# Patient Record
Sex: MALE | Race: WHITE | NOT HISPANIC OR LATINO | Employment: FULL TIME | URBAN - METROPOLITAN AREA
[De-identification: names, ages, dates, MRNs, and addresses within clinical notes are randomized per-mention and may not be internally consistent; named-entity substitution may affect disease eponyms.]

---

## 2017-08-01 DIAGNOSIS — I77.810 AORTIC ROOT DILATION: ICD-10-CM

## 2017-08-01 DIAGNOSIS — Q20.3 TRANSPOSITION OF THE GREAT ARTERIES, ISOLATED (SDD): ICD-10-CM

## 2017-08-01 NOTE — PROGRESS NOTES
I received a phone call from Dr. Kaya Welch regarding this patient.  He was last seen at [facility] about 8 years ago.  He has a history of ventricular septal defect, transposition of the great arteries, and he is status post arterial switch with VSD repair.  He subsequently required plication of his aortic root.  This was performed at [facility].  She would like him seen with a CTA of the chest to assess his root size.  Her last clinic visit was about 6 months ago.  At that time, an echocardiogram revealed an aortic root size of about 4.6 cm.

## 2017-08-08 ENCOUNTER — TELEPHONE (OUTPATIENT)
Dept: PEDIATRIC CARDIOLOGY | Facility: CLINIC | Age: 22
End: 2017-08-08

## 2017-08-08 NOTE — TELEPHONE ENCOUNTER
Left detailed message discussing tentatively appointment scheduled for 9/21.  Requested mom or patient call back to confirm date/time work or to reschedule.

## 2017-08-08 NOTE — TELEPHONE ENCOUNTER
----- Message from Darren Valladares MD sent at 8/1/2017 10:05 AM CDT -----  Can you call his mom, Yael, at [phone] to set him up for a visit in a [facility] with an echo, EKG, and non-coronary chest CTA.  I ordered the CT.    Thanks

## 2017-09-06 DIAGNOSIS — Q20.3 TGA (TRANSPOSITION OF GREAT ARTERIES): Primary | ICD-10-CM

## 2017-09-21 ENCOUNTER — HOSPITAL ENCOUNTER (OUTPATIENT)
Dept: CARDIOLOGY | Facility: CLINIC | Age: 22
Discharge: HOME | End: 2017-09-21
Payer: COMMERCIAL

## 2017-09-21 ENCOUNTER — HOSPITAL ENCOUNTER (OUTPATIENT)
Dept: RADIOLOGY | Facility: HOSPITAL | Age: 22
Discharge: HOME | End: 2017-09-21
Attending: PEDIATRICS
Payer: COMMERCIAL

## 2017-09-21 ENCOUNTER — OFFICE VISIT (OUTPATIENT)
Dept: CARDIOLOGY | Facility: CLINIC | Age: 22
End: 2017-09-21
Payer: COMMERCIAL

## 2017-09-21 VITALS
WEIGHT: 168 LBS | HEART RATE: 80 BPM | HEIGHT: 71 IN | SYSTOLIC BLOOD PRESSURE: 119 MMHG | OXYGEN SATURATION: 99 % | DIASTOLIC BLOOD PRESSURE: 60 MMHG | BODY MASS INDEX: 23.52 KG/M2

## 2017-09-21 DIAGNOSIS — Q25.6 PULMONARY ARTERY STENOSIS: ICD-10-CM

## 2017-09-21 DIAGNOSIS — Q20.3 TRANSPOSITION OF THE GREAT ARTERIES, ISOLATED (SDD): ICD-10-CM

## 2017-09-21 DIAGNOSIS — Q20.3 TGA (TRANSPOSITION OF GREAT ARTERIES): ICD-10-CM

## 2017-09-21 DIAGNOSIS — Z87.74 S/P VSD REPAIR: ICD-10-CM

## 2017-09-21 DIAGNOSIS — Q24.9 ADULT CONGENITAL HEART DISEASE: ICD-10-CM

## 2017-09-21 DIAGNOSIS — Q23.1 BICUSPID AORTIC VALVE: ICD-10-CM

## 2017-09-21 DIAGNOSIS — I77.810 AORTIC ROOT DILATION: ICD-10-CM

## 2017-09-21 PROCEDURE — 99999 PR PBB SHADOW E&M-EST. PATIENT-LVL IV: CPT | Mod: PBBFAC,,, | Performed by: PEDIATRICS

## 2017-09-21 PROCEDURE — 25500020 PHARM REV CODE 255: Performed by: PEDIATRICS

## 2017-09-21 PROCEDURE — 93000 ELECTROCARDIOGRAM COMPLETE: CPT | Mod: S$GLB,,, | Performed by: PEDIATRICS

## 2017-09-21 PROCEDURE — 71275 CT ANGIOGRAPHY CHEST: CPT | Mod: TC

## 2017-09-21 PROCEDURE — 99245 OFF/OP CONSLTJ NEW/EST HI 55: CPT | Mod: 25,S$GLB,, | Performed by: PEDIATRICS

## 2017-09-21 PROCEDURE — 93320 DOPPLER ECHO COMPLETE: CPT | Mod: S$GLB,,, | Performed by: INTERNAL MEDICINE

## 2017-09-21 PROCEDURE — 71275 CT ANGIOGRAPHY CHEST: CPT | Mod: 26,,, | Performed by: RADIOLOGY

## 2017-09-21 PROCEDURE — 93303 ECHO TRANSTHORACIC: CPT | Mod: S$GLB,,, | Performed by: INTERNAL MEDICINE

## 2017-09-21 PROCEDURE — 93325 DOPPLER ECHO COLOR FLOW MAPG: CPT | Mod: S$GLB,,, | Performed by: INTERNAL MEDICINE

## 2017-09-21 RX ADMIN — IOHEXOL 75 ML: 350 INJECTION, SOLUTION INTRAVENOUS at 12:09

## 2017-09-22 PROBLEM — Z87.74 S/P VSD REPAIR: Status: ACTIVE | Noted: 2017-09-22

## 2017-09-22 PROBLEM — Q24.9 ADULT CONGENITAL HEART DISEASE: Status: ACTIVE | Noted: 2017-09-22

## 2017-09-22 PROBLEM — Q25.6 PULMONARY ARTERY STENOSIS: Status: ACTIVE | Noted: 2017-09-22

## 2017-09-22 PROBLEM — Q23.1 BICUSPID AORTIC VALVE: Status: ACTIVE | Noted: 2017-09-22

## 2017-09-22 LAB
AORTIC VALVE REGURGITATION: ABNORMAL
AORTIC VALVE STENOSIS: ABNORMAL
DIASTOLIC DYSFUNCTION: NO
ESTIMATED PA SYSTOLIC PRESSURE: 32.81
MITRAL VALVE MOBILITY: NORMAL
MITRAL VALVE REGURGITATION: ABNORMAL
RETIRED EF AND QEF - SEE NOTES: 55 (ref 55–65)
TRICUSPID VALVE REGURGITATION: ABNORMAL

## 2017-09-22 NOTE — PROGRESS NOTES
2017    re:Ryan Sigala  :1995    Connie Mckeon MD  [address] [address]    Dr. Kaya Jimenez - [facility]  Dr. Nestor Quintanilla - [facility]    [facility]    Ryan Sigala is a 21 y.o. male seen today for his initial visit in the [facility] for evaluation of his complex congenital heart disease.  To summarize, his diagnoses are as follows:  1.  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  2.  Significant neoaortic valve insufficiency and neoaortic root enlargement status post repeat resection of sub-neoaortic valve obstruction and aortic root plication in  at [facility] by Dr. Willard.   - Trivial neoaortic valve insufficiency   - Significant, likely stable aortic root enlargement  3.  Likely mild branch pulmonary artery stenosis without evidence of significant right ventricular pressure overload.  4.  Bicuspid neoaortic valve with trivial insufficiency and mild stenosis.  5.  Dysphagia with family history of eosinophilic esophagitis in the brother.    To summarize, my plan is as follows:  1.  I will send my note, the echo, and a copy of the CT to Dr. Jimenez at [facility] for review.  I do not think that he needs an aortic root replacement at present.  Consideration could be given to starting a beta blocker or losartan although I am unclear as to the efficacy in this clinical setting.  Will also discuss activity restrictions.  2.  Likely repeat echo and CT scan (with the neck CT scan, I would plan on a better evaluation of the coronary arteries  with beta blockers given to slow the heart rate and a gated study performed) in a year along with an echo and ekg.  3.  I would recommend against heavy lifting and contact sports.  Regular light exercise is encouraged.  4.  As per guidelines, he does not require endocarditis prophylaxis before procedures.  However, I have no problems with them continuing this practice.  Regular dental care and good dental hygiene is necessary.  5.  I have called his gastroenterologist, Dr. Quintanilla, and left a message for him to call me back.  6.  Regular long-term care in adult congenital heart disease clinic with close monitoring for hypertension.    Discussion:  This is my first time seeing him.  He certainly has significant dilation of the aortic root, but the distal ascending aorta and the rest of the arch looks fine.  I suspect that his root size is stable as per my discussion with Dr. Welch.  I had a long discussion with the patient and his mother.  His neoaortic valve is bicuspid, and this may play a role in the dilated aortic root.  Aortic root dilation is common after the arterial switch operation as well.  It is not appropriate to apply Marfan's related guidelines in this setting.  I suspect that the appropriate approach is to monitor him closely but not intervene on the aortic root at this time.  I will discuss this with the team at [facility].  He is not hypertensive.  I will discuss with the team in [place] about starting a beta blocker or angiotensin receptor blocker.  However, in the absence of hypertension I'm not sure there is any benefit.  He does have mild narrowing of his branch pulmonary arteries, but the right ventricle has normal function and no hypertrophy and the tricuspid insufficiency jet estimates very mildly increased right ventricular pressure.  I see no need for intervention at present.    He does have dysphagia.  I doubt this is secondary to any vascular abnormality.  I reviewed his CT  scan, and the trachea which is anterior to the esophagus does not appear to be narrowed at all.  However, the esophagus itself was not visualized well on the CT scan and it is certainly possible he could have some vascular obstruction.  This may be obvious on upper endoscopy, but I wonder if an upper GI beforehand would be helpful.  I will discuss this with Dr. Quintanilla.    The coronary arteries were not well visualized on this study.  It was a non-gated study, and his heart rate was elevated.  At his next CT scan, likely in a year, we will plan to better assess the coronary arteries.    History of present illness:  The history is provided by the patient and his mother.  They are excellent historians.  His past history is as noted above.  He is asymptomatic from a cardiac standpoint.  He denies chest pain, palpitations, syncope, near-syncope, cyanosis, edema, shortness of breath, and worsening dyspnea on exertion.  However, he does have dysphagia.  This started about a year ago and seems to of gotten worse.  He has no problems with liquids, but solid food sometimes gets stuck in his throat and can take some time to pass further down.  Interestingly, his brother has a similar history and has been diagnosed with what sounds like eosinophilic esophagitis.  That brother has multiple food allergies while Ryan does not.    The review of systems is as noted above. It is otherwise negative for other symptoms related to the general, neurological, psychiatric, endocrine, gastrointestinal, genitourinary, respiratory, dermatologic, musculoskeletal, hematologic, and immunologic systems.    Past Medical History:   Diagnosis Date    Congenital heart disease     D-TGA, VSD, subpulmonary stenosis     Past Surgical History:   Procedure Laterality Date    BT shunt      left thoracotomy, [facility], 3 days of age    CARDIAC SURGERY      Arterial switch, VSD closure, subPS resection at [facility] at 21 months of age    CARDIAC  "SURGERY  2008    subaortic stenosis resection, aortic root plication     Family History   Problem Relation Age of Onset    Lung cancer Father     Congenital heart disease Neg Hx     Early death Neg Hx     Long QT syndrome Neg Hx     SIDS Neg Hx      Social History     Social History    Marital status: Single     Spouse name: N/A    Number of children: N/A    Years of education: N/A     Social History Main Topics    Smoking status: Never Smoker    Smokeless tobacco: Never Used    Alcohol use No    Drug use: Unknown    Sexual activity: Not Asked     Other Topics Concern    None     Social History Narrative    Graduated from [employer] with degree in psychology.  Working on getting employment.     No current outpatient prescriptions on file prior to visit.     Current Facility-Administered Medications on File Prior to Visit   Medication Dose Route Frequency Provider Last Rate Last Dose    [COMPLETED] omnipaque 350 iohexol 75 mL  75 mL Intravenous ONCE PRN Darren Valladares MD   75 mL at 09/21/17 1247     Review of patient's allergies indicates:  No Known Allergies    Vitals:    09/21/17 1502 09/21/17 1505   BP: 110/64 119/60   BP Location: Left arm Right arm   Patient Position: Sitting Sitting   BP Method: Large (Automatic) Large (Automatic)   Pulse: 86 80   SpO2: 99% 99%   Weight: 76.2 kg (167 lb 15.9 oz) 76.2 kg (167 lb 15.9 oz)   Height: 5' 11" (1.803 m) 5' 11" (1.803 m)     In general, he is a very healthy-appearing nondysmorphic male in no apparent distress.  The eyes, nares, and oropharynx are clear.  Eyelids and conjunctiva are normal without drainage or erythema.  Pupils equal and round bilaterally.  The head is normocephalic and atraumatic.  The neck is supple without jugular venous distention or thyroid enlargement.  The lungs are clear to auscultation bilaterally.  There is a well-healed median sternotomy scar and a well-healed left thoracotomy scar.  The first and second heart sounds are normal.  " There are no gallops, rubs, or clicks in the seated position.  He does have a grade 2/6 systolic ejection murmur heard best at the upper left and right sternal borders.  The murmur radiates to his neck.  The abdominal exam is benign without hepatosplenomegaly, tenderness, or distention.  Pulses are normal in all 4 extremities with brisk capillary refill and no clubbing, cyanosis, or edema.  No rashes are noted.    A CT scan today reveals:  Overview:     - Situs solitus with two atria; two ventricles in D-loop with AV concordant; two great arteries in typical configuration for postoperative Jatene procedure for correction of D-TGA.      - LEFT aortic arch.      - Leftward cardiac apex and left-sided stomach.    - 6 sternal sutures, 2 in the manubrium and 4 in the body of the sternum.  Findings:  Neck:  There are multiple subcentimeter low attenuation lesions in both lobes of the thyroid gland.  Clinical considerations will determine if ultrasound assessment for further characterization is warranted.  I detect no other abnormality at the base of the neck.  Aorta and coronary arteries:  There is left-sided arch with 3 branch vessels.  Internal mammary arteries arise in normal fashion.  There is marked enlargement of the aortic root and proximal ascending aorta.  Details of the thoracic aorta:      - On coronal reconstruction the sinotubular junction measures 6.0 cm.      - The proximal ascending aorta measures 5.7 cm.      - The mid ascending aorta measures 5.5 cm.      - The distal ascending aorta, just proximal to the inferior margin of the LEFT innominate vein, measures 3.0 cm.    - The arch measures 2.2 cm between the LEFT common carotid and LEFT subclavian arteries.      - Isthmus including the ductus bump measures 2.7 cm.      - Distal descending aorta maintains normal caliber, contour and course.    The site of the Jatene anastomosis may lie between the mid and distal ascending aorta where there is a marked  change in the caliber of the ascending aorta as described above.  Please note that the measurements provided above were obtained on non-gated CTA in levophase.  If warranted, repeat evaluation using gated TAVR protocol might provide more accurate measurements; if assessment of proximal coronary arteries is also desired on TAVR study, consider inclusion of beta-blocker administration to reduce the heart rate to something on the order of 60 bpm.     Coronary arteries:        -- LCA arises from the anterior wall of the ascending aorta to the left of midline, near the posterior and left margin of the anteriorly located mPA following Jatene procedure.  LCA maintains normal caliber, contour and course, dividing into LAD and Cx with typical branches.  The LAD is a small vessel, running to the left of the IV groove.        -- The RCA arises from the RIGHT lateral wall of the ascending aorta anterior to the RSVC and cephalad to the right atrial appendage.  It has a slender lumen as it courses along the lateral wall of the AAo and over the RAA.  It may have a slit-like orifice.  The RCA is markedly dominant.      As noted above, gated TAVR study is contemplated and assessment of proximal coronary arteries is also desired on that study, consider inclusion of beta-blocker administration to reduce the heart rate to something on the order of 60 bpm.     Aortic origin:       Following Jatene procedure the aorta arises from the LV outflow tract with the VSD patch material noted at the RIGHT and inferior margin of the LVOT as described below.  Ventricles, AV and VA connections; VSD patch:    - 2 ventricles are present in typical D. loop.  Interventricular septum maintains normal rounded contour are going for normal LEFT and RIGHT ventricular pressures.    - Atrioventricular connections are concordant.    - VSD patch material runs along the right wall of the LVOT extending inferiorly to the perimenbranous location near the  implantation of the septal leaflet of the tricuspid valve. The VSD patch is remote from the septal leaflet of the mitral valve with 1.7 cm of atrioventricular septum between the septal TcV and MV leaflets  the subaortic portion of the LV chamber from the RA chamber (see axial series 2 image 164).    - There appears to be thinning of the anterior portion of the wall in the proximal RV outflow tract (axial series 2 images 136 through 140; sagittal series 202 image 99), located at the level of the fifth of 6 sternal sutures.    - RV outflow tract supports the pulmonary artery.  CT neither confirms nor excludes pulmonary valve tissue although absence of RV enlargement argues against free pulmonary insufficiency.  Pulmonary arteries:    - Pulmonary trunk lies in the midline, closely related to the posterior table of the sternum with minimal fat interposed between the sternum and the pulmonary trunk at the levels of the second through fourth sternal wires.    - Pulmonary trunk divides divides in typical fashion for Jatene procedure with LEFT and RIGHT pulmonary arteries draped over the ascending aorta.    - I detect no stenosis in the pulmonary artery distribution.  Hemostasis clip is present at the superior margin of the LEFT pulmonary artery.  Systemic veno atrial connections:  Concordant    - Systemic veins return in typical concordant fashion via the RIGHT SVC, azygos vein to the SVC, and IVC to the RIGHT atrium.  Pulmonary venoatrial connections:  Concordant    - There are 5 pulmonary veins (LSPV, LIPV, RIPV, venous branch fr superior segment RLL, RSPV) that return in typical fashion to the LA.      - The venous branch draining the superior segment of the right lower lobe enters the LA at the superior margin of the RIPV (see coronal series 201 image 69).  Pleura, pericardium, mediastinum, ayo, LNs, esophagus:    - There is no pleural or pericardial fluid, no pleural or pericardial calcification and no  lymph node enlargement.  The mediastinal fat appears unremarkable.    - Esophagus maintains normal caliber and course.    Abdomen:    - I detect no bowel distension, free air, biliary dilatation or other significant abnormality involving structures in the upper abdomen.      - Prompt bilateral nephrograms are present with medullary enhancement; renal collecting systems are not included on the study.  Chest wall and extrathoracic soft tissues:    - There are 6 intact sternal sutures, 2 of which lie in the manubrium.  Sternal fragments are fused.    - There is no segmentation anomaly of ribs or vertebral bodies.    - I detect no significant abnormality of the extrathoracic soft tissues.   Airways and lungs:    - Airways distribute normally.  I detect no endobronchial lesion.    - Lungs are clear.  No pulmonary disease detected.   Impression     1.  21-year-old patient status post Jatene procedure for correction of transposition of the great arteries has marked dilatation of the aortic root and mid ascending aorta with measurements provided above.  2.  There are 2 coronary arteries.  Markedly dominant RIGHT coronary artery appears narrowed as it courses around the dilated aortic root to achieve the atrioventricular septum; it may have a slitlike orifice.  Please see above for discussion of the role of gated TAVR protocol for assessment of the aortic root, and the role of beta blockers in gated TAVR for assessment of proximal coronary arteries.   3.  Additional findings above.       Echo today:  CONCLUSIONS     1 - Normal right size with normal RV systolic function.     2 - Mild tricuspid regurgitation.     3 - The estimated PA systolic pressure is 33 mmHg.     4 - Neopulmonary valve is not well seen.     5 - Clint manevuar with flow accelertion most likely in the left PA with a peak velocity of <2m/sec; the PA's are not well seen.     6 - Normal size left heart with normal LV systolic function.     7 - Mild mitral  regurgitation.     8 - Bi-leaflet neoaortic valve with severely dilated aortic root and normal size aortic root; There is trivial AI with mild stenosis with a peak velocity of 2.87 m/sec and mean gradien tof 22mmHg .   The aortic root is severely enlarged, measuring 5.0 cm at sinotubular junction and 5.3 cm at Sinuses of Valsalva. The proximal ascending aorta is normal in size, measuring 3.6 cm across.     Thank you for referring this patient to our clinic.  Please call with any questions.    Sincerely,        Darren Valladares MD  Pediatric Cardiology  Adult Congenital Heart Disease  Pediatric Heart Failure and Transplantation  [facility] [address] [place]  [address]  [phone]

## 2017-09-26 ENCOUNTER — TELEPHONE (OUTPATIENT)
Dept: PEDIATRIC CARDIOLOGY | Facility: CLINIC | Age: 22
End: 2017-09-26

## 2017-09-26 NOTE — TELEPHONE ENCOUNTER
Left message with the mother and patient.  I spoke with GI doctor, Dr. Monte.  He plans on getting an esophagram prior to EGD.  A packet was mailed to Dr. Jimenez at [facility] today.

## 2018-01-12 ENCOUNTER — TELEPHONE (OUTPATIENT)
Dept: PEDIATRIC CARDIOLOGY | Facility: CLINIC | Age: 23
End: 2018-01-12

## 2018-01-12 NOTE — TELEPHONE ENCOUNTER
Called patient's number and his mother's number and left a message on both.    I received a letter from Dr. Jimenez at [facility].  He agrees that there is no indication for surgical intervention at present (would likely use 6cm or rapid growth).    My recommendations to the patient:  1. start losartan 25mg daily - questionable benefit but may help slow root enlargement and likely quite safe  2. yearly echo, MRI, ekg.  Would switch to MRI from CT to avoid regular radiation exposure.  3. Avoid highly strenuous exercise and heavy weight lifting.    I left them my number and asked for them to call back.

## 2018-02-05 ENCOUNTER — TELEPHONE (OUTPATIENT)
Dept: PEDIATRIC CARDIOLOGY | Facility: HOSPITAL | Age: 23
End: 2018-02-05

## 2018-02-05 NOTE — TELEPHONE ENCOUNTER
----- Message from Janet Fitch RN sent at 1/18/2018 11:35 AM CST -----  Contact: Yael Sigala [phone]      ----- Message -----  From: Megan Hayes  Sent: 1/18/2018  11:18 AM  To: [facility] Staff    Returning missed call.

## 2018-02-05 NOTE — TELEPHONE ENCOUNTER
Discussed with the patient's mother (patient has given me permission).  She agrees with the current plan except regarding losartan.  I explained that we really don't have great data to show benefit in this specific situation (dilated aortic root after arterial switch), but I would recommend it given very low risk of side

## 2018-02-06 ENCOUNTER — DOCUMENTATION ONLY (OUTPATIENT)
Dept: PEDIATRIC CARDIOLOGY | Facility: CLINIC | Age: 23
End: 2018-02-06

## 2018-11-06 DIAGNOSIS — Q20.3 TRANSPOSITION OF THE GREAT ARTERIES, ISOLATED (SDD): Primary | ICD-10-CM

## 2018-11-09 ENCOUNTER — TELEPHONE (OUTPATIENT)
Dept: PEDIATRIC CARDIOLOGY | Facility: CLINIC | Age: 23
End: 2018-11-09

## 2018-11-09 NOTE — TELEPHONE ENCOUNTER
Left voice mail for Mr Sigala, regarding f/u appointment with Dr Valladares, EKG and ECHO. Possible date 12/06/18  9:15 EKG, 9:30 ECHO, 10:30 Dr Valladares. Left name and callback number.

## 2018-11-19 DIAGNOSIS — Q24.9 ADULT CONGENITAL HEART DISEASE: ICD-10-CM

## 2018-11-19 DIAGNOSIS — Q20.3 TRANSPOSITION OF THE GREAT ARTERIES, ISOLATED (SDD): Primary | ICD-10-CM

## 2018-12-06 ENCOUNTER — HOSPITAL ENCOUNTER (OUTPATIENT)
Dept: CARDIOLOGY | Facility: CLINIC | Age: 23
Discharge: HOME | End: 2018-12-06
Attending: PEDIATRICS
Payer: COMMERCIAL

## 2018-12-06 ENCOUNTER — HOSPITAL ENCOUNTER (OUTPATIENT)
Dept: CARDIOLOGY | Facility: CLINIC | Age: 23
Discharge: HOME | End: 2018-12-06
Payer: COMMERCIAL

## 2018-12-06 ENCOUNTER — OFFICE VISIT (OUTPATIENT)
Dept: CARDIOLOGY | Facility: CLINIC | Age: 23
End: 2018-12-06
Payer: COMMERCIAL

## 2018-12-06 VITALS
HEIGHT: 71 IN | DIASTOLIC BLOOD PRESSURE: 72 MMHG | WEIGHT: 167 LBS | OXYGEN SATURATION: 99 % | WEIGHT: 166.88 LBS | SYSTOLIC BLOOD PRESSURE: 118 MMHG | BODY MASS INDEX: 23.36 KG/M2 | HEART RATE: 79 BPM | HEIGHT: 71 IN | BODY MASS INDEX: 23.38 KG/M2 | HEART RATE: 84 BPM

## 2018-12-06 DIAGNOSIS — Q25.6 PULMONARY ARTERY STENOSIS: ICD-10-CM

## 2018-12-06 DIAGNOSIS — I71.20 THORACIC AORTIC ANEURYSM WITHOUT RUPTURE: ICD-10-CM

## 2018-12-06 DIAGNOSIS — Q24.9 ADULT CONGENITAL HEART DISEASE: ICD-10-CM

## 2018-12-06 DIAGNOSIS — Z87.74 S/P VSD REPAIR: ICD-10-CM

## 2018-12-06 DIAGNOSIS — Q20.3 TRANSPOSITION OF THE GREAT ARTERIES, ISOLATED (SDD): ICD-10-CM

## 2018-12-06 DIAGNOSIS — Q23.1 BICUSPID AORTIC VALVE: ICD-10-CM

## 2018-12-06 DIAGNOSIS — I77.810 AORTIC ROOT DILATION: ICD-10-CM

## 2018-12-06 DIAGNOSIS — Q24.9 ADULT CONGENITAL HEART DISEASE: Primary | ICD-10-CM

## 2018-12-06 LAB
ASCENDING AORTA: 3.56 CM
AV INDEX (PROSTH): 0.39
AV MEAN GRADIENT: 13.24 MMHG
AV PEAK GRADIENT: 23.62 MMHG
AV VALVE AREA: 2.2 CM2
BSA FOR ECHO PROCEDURE: 1.95 M2
CV ECHO LV RWT: 0.36 CM
DOP CALC AO PEAK VEL: 2.43 M/S
DOP CALC AO VTI: 41.83 CM
DOP CALC LVOT AREA: 5.68 CM2
DOP CALC LVOT DIAMETER: 2.69 CM
DOP CALC LVOT STROKE VOLUME: 92.02 CM3
DOP CALCLVOT PEAK VEL VTI: 16.2 CM
E WAVE DECELERATION TIME: 230.57 MSEC
E/A RATIO: 1.51
E/E' RATIO: 5.26
ECHO LV POSTERIOR WALL: 0.85 CM (ref 0.6–1.1)
FRACTIONAL SHORTENING: 30 % (ref 28–44)
INTERVENTRICULAR SEPTUM: 0.97 CM (ref 0.6–1.1)
LA MAJOR: 4.99 CM
LA MINOR: 4.68 CM
LA WIDTH: 3.19 CM
LEFT ATRIUM SIZE: 2.84 CM
LEFT ATRIUM VOLUME INDEX: 19 ML/M2
LEFT ATRIUM VOLUME: 37.19 CM3
LEFT INTERNAL DIMENSION IN SYSTOLE: 3.32 CM (ref 2.1–4)
LEFT VENTRICLE DIASTOLIC VOLUME INDEX: 53.24 ML/M2
LEFT VENTRICLE DIASTOLIC VOLUME: 103.99 ML
LEFT VENTRICLE MASS INDEX: 74.9 G/M2
LEFT VENTRICLE SYSTOLIC VOLUME INDEX: 22.9 ML/M2
LEFT VENTRICLE SYSTOLIC VOLUME: 44.77 ML
LEFT VENTRICULAR INTERNAL DIMENSION IN DIASTOLE: 4.73 CM (ref 3.5–6)
LEFT VENTRICULAR MASS: 146.36 G
LV LATERAL E/E' RATIO: 3.74
LV SEPTAL E/E' RATIO: 8.88
MV PEAK A VEL: 0.47 M/S
MV PEAK E VEL: 0.71 M/S
PISA TR MAX VEL: 2.5 M/S
RA MAJOR: 4.8 CM
RA WIDTH: 3.54 CM
RIGHT VENTRICULAR END-DIASTOLIC DIMENSION: 3.65 CM
RV TISSUE DOPPLER FREE WALL SYSTOLIC VELOCITY 1 (APICAL 4 CHAMBER VIEW): 7.97 M/S
SINUS: 4.99 CM
STJ: 4.84 CM
TDI LATERAL: 0.19
TDI SEPTAL: 0.08
TDI: 0.14
TR MAX PG: 25 MMHG
TRICUSPID ANNULAR PLANE SYSTOLIC EXCURSION: 1.53 CM

## 2018-12-06 PROCEDURE — 3008F BODY MASS INDEX DOCD: CPT | Mod: CPTII,S$GLB,, | Performed by: PEDIATRICS

## 2018-12-06 PROCEDURE — 93303 ECHO TRANSTHORACIC: CPT | Mod: S$GLB,,, | Performed by: PEDIATRICS

## 2018-12-06 PROCEDURE — 99999 PR PBB SHADOW E&M-EST. PATIENT-LVL III: CPT | Mod: PBBFAC,,, | Performed by: PEDIATRICS

## 2018-12-06 PROCEDURE — 99214 OFFICE O/P EST MOD 30 MIN: CPT | Mod: 25,S$GLB,, | Performed by: PEDIATRICS

## 2018-12-06 PROCEDURE — 93005 ELECTROCARDIOGRAM TRACING: CPT | Mod: S$GLB,,, | Performed by: PEDIATRICS

## 2018-12-06 PROCEDURE — 93010 ELECTROCARDIOGRAM REPORT: CPT | Mod: S$GLB,,, | Performed by: INTERNAL MEDICINE

## 2018-12-07 ENCOUNTER — TELEPHONE (OUTPATIENT)
Dept: PEDIATRIC CARDIOLOGY | Facility: CLINIC | Age: 23
End: 2018-12-07

## 2018-12-07 NOTE — PROGRESS NOTES
2018    re:Ryan Sigala  :1995    Connie Mckeon MD  [address] [facility] [address]    Dr. Kaya Jimenez - [facility]  Dr. Nestor Quintanilla - [facility]    [facility]    Ryan Sigala is a 22 y.o. male seen today for his initial visit in the [facility] for evaluation of his complex congenital heart disease.  To summarize, his diagnoses are as follows:  1.  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  2.  Significant neoaortic valve insufficiency and neoaortic root enlargement status post repeat resection of sub-neoaortic valve obstruction and aortic root plication in  at [facility] by Dr. Willard.   - Trivial neoaortic valve insufficiency and trivial stenosis   - Significant, likely stable aortic root enlargement  3.  Likely mild branch pulmonary artery stenosis without evidence of significant obstruction.    To summarize, my plan is as follows:  1.  Repeat cardiac CT scan.  Specifically, we will get a TAVR CT as it should show the coronary as well.  2.  Likely repeat echo and get a cardiac MRI in 1 year.  3.  I would recommend against heavy lifting and contact sports.  Regular light exercise is encouraged.  4.  As per guidelines, he does not require endocarditis prophylaxis before procedures.  However, I have no problems with them continuing this practice.  Regular dental care and good dental hygiene is necessary.  5.  Regular long-term care in adult congenital heart disease clinic with close monitoring for hypertension.  6.  After the CTA, I will send studies  up to Dr. Jimenez in [place] for his review.    Discussion:  THe certainly has significant dilation of the aortic root, but the distal ascending aorta and the rest of the arch looks fine, and are echo measurements are stable over the past year.  A year ago, I sent his studies up to [facility] to be evaluated by Dr. Jimenez.  He agreed that the aortic root had been stable for the past 8 years and that there was no indication for intervention. I had a long discussion with the patient and his mother.  His neoaortic valve is bicuspid, and this may play a role in the dilated aortic root.  Aortic root dilation is common after the arterial switch operation as well.  It is not appropriate to apply Marfan's related guidelines in this setting.  I suspect that the appropriate approach is to monitor him closely but not intervene on the aortic root at this time.  He is not hypertensive.  I considered starting losartan last year, but the patient and his mother did not want to add a medication unless there was very solid data to support its use.    History of present illness:  I last saw him a year ago.  He has been completely asymptomatic since that time without chest pain, palpitations, syncope, near syncope, cyanosis, or edema.  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  He goes to [employer].    The review of systems is as noted above. It is otherwise negative for other symptoms related to the general, neurological, psychiatric, endocrine, gastrointestinal, genitourinary, respiratory, dermatologic, musculoskeletal, hematologic, and immunologic systems.    Past Medical History:   Diagnosis Date    Congenital heart disease     D-TGA, VSD, subpulmonary stenosis     Past Surgical History:   Procedure Laterality Date    BT shunt      left thoracotomy, [facility], 3 days of age    CARDIAC SURGERY      Arterial switch, VSD closure, subPS resection at [facility] at 21 months of age    CARDIAC SURGERY  2008     "subaortic stenosis resection, aortic root plication     Family History   Problem Relation Age of Onset    Lung cancer Father     Congenital heart disease Neg Hx     Early death Neg Hx     Long QT syndrome Neg Hx     SIDS Neg Hx      Social History     Socioeconomic History    Marital status: Single     Spouse name: None    Number of children: None    Years of education: None    Highest education level: None   Social Needs    Financial resource strain: None    Food insecurity - worry: None    Food insecurity - inability: None    Transportation needs - medical: None    Transportation needs - non-medical: None   Occupational History    None   Tobacco Use    Smoking status: Never Smoker    Smokeless tobacco: Never Used   Substance and Sexual Activity    Alcohol use: No    Drug use: None    Sexual activity: None   Other Topics Concern    None   Social History Narrative    Graduated from [employer] with degree in psychology.  Working on getting employment.     No current outpatient medications on file prior to visit.     No current facility-administered medications on file prior to visit.      Review of patient's allergies indicates:  No Known Allergies    Vitals:    12/06/18 1543   BP: 118/72   BP Location: Left arm   Patient Position: Sitting   BP Method: Large (Automatic)   Pulse: 79   SpO2: 99%   Weight: 75.7 kg (166 lb 14.2 oz)   Height: 5' 11" (1.803 m)     In general, he is a very healthy-appearing nondysmorphic male in no apparent distress.  The eyes, nares, and oropharynx are clear.  Eyelids and conjunctiva are normal without drainage or erythema.  Pupils equal and round bilaterally.  The head is normocephalic and atraumatic.  The neck is supple without jugular venous distention or thyroid enlargement.  The lungs are clear to auscultation bilaterally.  There is a well-healed median sternotomy scar and a well-healed left thoracotomy scar.  The first and second heart sounds are normal.  There are no " gallops, rubs, or clicks in the seated position.  He does have a grade 2/6 systolic ejection murmur heard best at the upper left and right sternal borders.  The murmur radiates to his neck.  The abdominal exam is benign without hepatosplenomegaly, tenderness, or distention.  Pulses are normal in all 4 extremities with brisk capillary refill and no clubbing, cyanosis, or edema.  No rashes are noted.    Echo today:  S/P arterial switch for d-transposition-  Right ventricle appears normal in size and structure with qualitatively good right ventricular systolic function.  Right ventricular systolic pressure estimated 25 mm Hg plus right atrial pressure.  Echodensity consistent with patch repair of ventricular septal defect and no residual shunt demonstrated.  Pulmonary arteries arise from anteriorly positioned pulmonary valve and passed either side of the aorta status post Ana maneuver.  Limited images of the right pulmonary artery suggest no significant stenosis.   Left pulmonary artery with minimal acceleration peak velocity < 1.5 m/sec passing to the left of the aorta  The left atrium appears normal in size.  There is mild discrete jet of mitral insufficiency.  Left ventricle appears normal in size and structure.  There is paradoxical septal motion with good movement of the left ventricular free wall, shortening fraction measured 30% and ejection fraction estimated 60-65% from apical views.  There is no obvious subaortic obstruction in the LV outflow.  Images suggest but not diagnostic for bicuspid neoaortic valve with mild acceleration to peak velocity 2.4 m/sec and mean gradient < 12 mm Hg.  There is a trivial jet of janelle aortic insufficiency.  The aortic root is dilated with sinuses of Valsalva measuring > 5.2 cm diameter.    An EKG reveals normal sinus rhythm with left axis deviation and left ventricular hypertrophy.    A CT scan 8/2017 reveals:  Overview:     - Situs solitus with two atria; two ventricles in  D-loop with AV concordant; two great arteries in typical configuration for postoperative Jatene procedure for correction of D-TGA.      - LEFT aortic arch.      - Leftward cardiac apex and left-sided stomach.    - 6 sternal sutures, 2 in the manubrium and 4 in the body of the sternum.  Findings:  Neck:  There are multiple subcentimeter low attenuation lesions in both lobes of the thyroid gland.  Clinical considerations will determine if ultrasound assessment for further characterization is warranted.  I detect no other abnormality at the base of the neck.  Aorta and coronary arteries:  There is left-sided arch with 3 branch vessels.  Internal mammary arteries arise in normal fashion.  There is marked enlargement of the aortic root and proximal ascending aorta.  Details of the thoracic aorta:      - On coronal reconstruction the sinotubular junction measures 6.0 cm.      - The proximal ascending aorta measures 5.7 cm.      - The mid ascending aorta measures 5.5 cm.      - The distal ascending aorta, just proximal to the inferior margin of the LEFT innominate vein, measures 3.0 cm.    - The arch measures 2.2 cm between the LEFT common carotid and LEFT subclavian arteries.      - Isthmus including the ductus bump measures 2.7 cm.      - Distal descending aorta maintains normal caliber, contour and course.    The site of the Jatene anastomosis may lie between the mid and distal ascending aorta where there is a marked change in the caliber of the ascending aorta as described above.  Please note that the measurements provided above were obtained on non-gated CTA in levophase.  If warranted, repeat evaluation using gated TAVR protocol might provide more accurate measurements; if assessment of proximal coronary arteries is also desired on TAVR study, consider inclusion of beta-blocker administration to reduce the heart rate to something on the order of 60 bpm.     Coronary arteries:        -- LCA arises from the anterior  wall of the ascending aorta to the left of midline, near the posterior and left margin of the anteriorly located mPA following Jatene procedure.  LCA maintains normal caliber, contour and course, dividing into LAD and Cx with typical branches.  The LAD is a small vessel, running to the left of the IV groove.        -- The RCA arises from the RIGHT lateral wall of the ascending aorta anterior to the RSVC and cephalad to the right atrial appendage.  It has a slender lumen as it courses along the lateral wall of the AAo and over the RAA.  It may have a slit-like orifice.  The RCA is markedly dominant.      As noted above, gated TAVR study is contemplated and assessment of proximal coronary arteries is also desired on that study, consider inclusion of beta-blocker administration to reduce the heart rate to something on the order of 60 bpm.     Aortic origin:       Following Jatene procedure the aorta arises from the LV outflow tract with the VSD patch material noted at the RIGHT and inferior margin of the LVOT as described below.  Ventricles, AV and VA connections; VSD patch:    - 2 ventricles are present in typical D. loop.  Interventricular septum maintains normal rounded contour are going for normal LEFT and RIGHT ventricular pressures.    - Atrioventricular connections are concordant.    - VSD patch material runs along the right wall of the LVOT extending inferiorly to the perimenbranous location near the implantation of the septal leaflet of the tricuspid valve. The VSD patch is remote from the septal leaflet of the mitral valve with 1.7 cm of atrioventricular septum between the septal TcV and MV leaflets  the subaortic portion of the LV chamber from the RA chamber (see axial series 2 image 164).    - There appears to be thinning of the anterior portion of the wall in the proximal RV outflow tract (axial series 2 images 136 through 140; sagittal series 202 image 99), located at the level of the fifth of 6  sternal sutures.    - RV outflow tract supports the pulmonary artery.  CT neither confirms nor excludes pulmonary valve tissue although absence of RV enlargement argues against free pulmonary insufficiency.  Pulmonary arteries:    - Pulmonary trunk lies in the midline, closely related to the posterior table of the sternum with minimal fat interposed between the sternum and the pulmonary trunk at the levels of the second through fourth sternal wires.    - Pulmonary trunk divides divides in typical fashion for Jatene procedure with LEFT and RIGHT pulmonary arteries draped over the ascending aorta.    - I detect no stenosis in the pulmonary artery distribution.  Hemostasis clip is present at the superior margin of the LEFT pulmonary artery.  Systemic veno atrial connections:  Concordant    - Systemic veins return in typical concordant fashion via the RIGHT SVC, azygos vein to the SVC, and IVC to the RIGHT atrium.  Pulmonary venoatrial connections:  Concordant    - There are 5 pulmonary veins (LSPV, LIPV, RIPV, venous branch fr superior segment RLL, RSPV) that return in typical fashion to the LA.      - The venous branch draining the superior segment of the right lower lobe enters the LA at the superior margin of the RIPV (see coronal series 201 image 69).  Pleura, pericardium, mediastinum, ayo, LNs, esophagus:    - There is no pleural or pericardial fluid, no pleural or pericardial calcification and no lymph node enlargement.  The mediastinal fat appears unremarkable.    - Esophagus maintains normal caliber and course.    Abdomen:    - I detect no bowel distension, free air, biliary dilatation or other significant abnormality involving structures in the upper abdomen.      - Prompt bilateral nephrograms are present with medullary enhancement; renal collecting systems are not included on the study.  Chest wall and extrathoracic soft tissues:    - There are 6 intact sternal sutures, 2 of which lie in the manubrium.   Sternal fragments are fused.    - There is no segmentation anomaly of ribs or vertebral bodies.    - I detect no significant abnormality of the extrathoracic soft tissues.   Airways and lungs:    - Airways distribute normally.  I detect no endobronchial lesion.    - Lungs are clear.  No pulmonary disease detected.   Impression     1.  21-year-old patient status post Jatene procedure for correction of transposition of the great arteries has marked dilatation of the aortic root and mid ascending aorta with measurements provided above.  2.  There are 2 coronary arteries.  Markedly dominant RIGHT coronary artery appears narrowed as it courses around the dilated aortic root to achieve the atrioventricular septum; it may have a slitlike orifice.  Please see above for discussion of the role of gated TAVR protocol for assessment of the aortic root, and the role of beta blockers in gated TAVR for assessment of proximal coronary arteries.   3.  Additional findings above.       Echo today:  CONCLUSIONS     1 - Normal right size with normal RV systolic function.     2 - Mild tricuspid regurgitation.     3 - The estimated PA systolic pressure is 33 mmHg.     4 - Neopulmonary valve is not well seen.     5 - Clint manevuar with flow accelertion most likely in the left PA with a peak velocity of <2m/sec; the PA's are not well seen.     6 - Normal size left heart with normal LV systolic function.     7 - Mild mitral regurgitation.     8 - Bi-leaflet neoaortic valve with severely dilated aortic root and normal size aortic root; There is trivial AI with mild stenosis with a peak velocity of 2.87 m/sec and mean gradien tof 22mmHg .   The aortic root is severely enlarged, measuring 5.0 cm at sinotubular junction and 5.3 cm at Sinuses of Valsalva. The proximal ascending aorta is normal in size, measuring 3.6 cm across.     Thank you for referring this patient to our clinic.  Please call with any questions.    Sincerely,        Darren  WESTON Valladares MD  Pediatric Cardiology  Adult Congenital Heart Disease  Pediatric Heart Failure and Transplantation  [facility] [address] [place]  [address]  [phone]

## 2018-12-12 ENCOUNTER — TELEPHONE (OUTPATIENT)
Dept: PEDIATRIC CARDIOLOGY | Facility: CLINIC | Age: 23
End: 2018-12-12

## 2019-01-04 ENCOUNTER — HOSPITAL ENCOUNTER (OUTPATIENT)
Dept: RADIOLOGY | Facility: HOSPITAL | Age: 24
Discharge: HOME | End: 2019-01-04
Attending: PEDIATRICS
Payer: COMMERCIAL

## 2019-01-04 VITALS — DIASTOLIC BLOOD PRESSURE: 45 MMHG | SYSTOLIC BLOOD PRESSURE: 101 MMHG | HEART RATE: 74 BPM

## 2019-01-04 DIAGNOSIS — I71.20 THORACIC AORTIC ANEURYSM WITHOUT RUPTURE: ICD-10-CM

## 2019-01-04 PROCEDURE — 25500020 PHARM REV CODE 255: Performed by: PEDIATRICS

## 2019-01-04 PROCEDURE — 75574 CTA CARDIAC: ICD-10-PCS | Mod: 26,,, | Performed by: RADIOLOGY

## 2019-01-04 PROCEDURE — 63600175 PHARM REV CODE 636 W HCPCS: Performed by: INTERNAL MEDICINE

## 2019-01-04 PROCEDURE — 75574 CT ANGIO HRT W/3D IMAGE: CPT | Mod: 26,,, | Performed by: RADIOLOGY

## 2019-01-04 PROCEDURE — 25000003 PHARM REV CODE 250: Performed by: INTERNAL MEDICINE

## 2019-01-04 PROCEDURE — 75574 CT ANGIO HRT W/3D IMAGE: CPT | Mod: TC

## 2019-01-04 RX ORDER — METOPROLOL TARTRATE 1 MG/ML
5 INJECTION, SOLUTION INTRAVENOUS ONCE
Status: COMPLETED | OUTPATIENT
Start: 2019-01-04 | End: 2019-01-04

## 2019-01-04 RX ORDER — NITROGLYCERIN 0.4 MG/1
0.4 TABLET SUBLINGUAL ONCE
Status: COMPLETED | OUTPATIENT
Start: 2019-01-04 | End: 2019-01-04

## 2019-01-04 RX ADMIN — METOPROLOL TARTRATE 5 MG: 5 INJECTION, SOLUTION INTRAVENOUS at 04:01

## 2019-01-04 RX ADMIN — NITROGLYCERIN 0.4 MG: 0.4 TABLET SUBLINGUAL at 04:01

## 2019-01-04 RX ADMIN — IOHEXOL 75 ML: 350 INJECTION, SOLUTION INTRAVENOUS at 04:01

## 2019-01-04 NOTE — PROGRESS NOTES
CT Cardiac complete.  VSS, IV removed. Pt informed of possible side effects.  Pt ambulated through morgan to lobby.

## 2019-01-23 ENCOUNTER — TELEPHONE (OUTPATIENT)
Dept: PEDIATRIC CARDIOLOGY | Facility: CLINIC | Age: 24
End: 2019-01-23

## 2019-01-23 DIAGNOSIS — Q24.9 CONGENITAL HEART DISEASE: ICD-10-CM

## 2019-01-23 NOTE — TELEPHONE ENCOUNTER
CTA reviewed.  Completely unchanged (6cm root) from last year, unchanged over the past 9 years.  Coronaries look good.  Plan to see him yearly - will get a cardiac MRI next year.

## 2020-06-30 ENCOUNTER — TELEPHONE (OUTPATIENT)
Dept: PEDIATRIC CARDIOLOGY | Facility: CLINIC | Age: 25
End: 2020-06-30

## 2020-06-30 DIAGNOSIS — Z87.74 S/P VSD REPAIR: ICD-10-CM

## 2020-06-30 DIAGNOSIS — Q25.6 PULMONARY ARTERY STENOSIS: ICD-10-CM

## 2020-06-30 DIAGNOSIS — Q20.3 TRANSPOSITION OF THE GREAT ARTERIES, ISOLATED (SDD): Primary | ICD-10-CM

## 2020-06-30 DIAGNOSIS — Q23.1 BICUSPID AORTIC VALVE: ICD-10-CM

## 2020-06-30 DIAGNOSIS — I77.810 AORTIC ROOT DILATION: ICD-10-CM

## 2020-06-30 DIAGNOSIS — Q24.9 ADULT CONGENITAL HEART DISEASE: ICD-10-CM

## 2020-06-30 NOTE — TELEPHONE ENCOUNTER
Called and left VM for patient to schedule [facility] visit with Dr. Valladares on Thursday 7/9/20 with just an EKG.  Contact information left for call back    ----- Message from Darren Valladares MD sent at 6/30/2020  3:36 PM CDT -----  Contact: Patient [phone]  Let's see him with the ekg and then we can get the other stuff later.  ----- Message -----  From: Rabia Shook RN  Sent: 6/30/2020   2:46 PM CDT  To: Darren Valladares MD    So next available [facility] ECHO is July 30.  Would this be someone you would want to see with just an EKG next week or get him to have his ECHO and MRI end of July/ early August with a visit?    Rabia  ----- Message -----  From: Kristel Lilly  Sent: 6/30/2020   1:45 PM CDT  To: [facility] Staff    Would like to receive medical advice.    Would they like a call back or a response via [facility]:  call back    Additional information:  Calling to schedule an appt for chest tightness and fainting. The pt states this happened over the weekend and requesting a call back with scheduling.

## 2020-07-09 ENCOUNTER — CLINICAL SUPPORT (OUTPATIENT)
Dept: PEDIATRIC CARDIOLOGY | Facility: CLINIC | Age: 25
End: 2020-07-09
Attending: PEDIATRICS
Payer: COMMERCIAL

## 2020-07-09 ENCOUNTER — OFFICE VISIT (OUTPATIENT)
Dept: CARDIOLOGY | Facility: CLINIC | Age: 25
End: 2020-07-09
Payer: COMMERCIAL

## 2020-07-09 ENCOUNTER — HOSPITAL ENCOUNTER (OUTPATIENT)
Dept: CARDIOLOGY | Facility: CLINIC | Age: 25
Discharge: HOME | End: 2020-07-09
Payer: COMMERCIAL

## 2020-07-09 VITALS
DIASTOLIC BLOOD PRESSURE: 72 MMHG | SYSTOLIC BLOOD PRESSURE: 115 MMHG | OXYGEN SATURATION: 98 % | WEIGHT: 164.88 LBS | HEART RATE: 79 BPM | HEIGHT: 71 IN | BODY MASS INDEX: 23.08 KG/M2

## 2020-07-09 DIAGNOSIS — I77.810 AORTIC ROOT DILATION: ICD-10-CM

## 2020-07-09 DIAGNOSIS — Q23.1 BICUSPID AORTIC VALVE: ICD-10-CM

## 2020-07-09 DIAGNOSIS — Z03.818 ENCOUNTER FOR OBSERVATION FOR SUSPECTED EXPOSURE TO OTHER BIOLOGICAL AGENTS RULED OUT: ICD-10-CM

## 2020-07-09 DIAGNOSIS — Q20.3 TRANSPOSITION OF THE GREAT ARTERIES: ICD-10-CM

## 2020-07-09 DIAGNOSIS — Q24.9 ADULT CONGENITAL HEART DISEASE: ICD-10-CM

## 2020-07-09 DIAGNOSIS — Q20.3 TRANSPOSITION OF THE GREAT ARTERIES, ISOLATED (SDD): ICD-10-CM

## 2020-07-09 DIAGNOSIS — Z87.74 S/P VSD REPAIR: ICD-10-CM

## 2020-07-09 DIAGNOSIS — Q25.6 PULMONARY ARTERY STENOSIS: ICD-10-CM

## 2020-07-09 DIAGNOSIS — Q20.3 TRANSPOSITION OF THE GREAT ARTERIES: Primary | ICD-10-CM

## 2020-07-09 DIAGNOSIS — Q24.9 CONGENITAL MALFORMATION OF HEART, UNSPECIFIED: ICD-10-CM

## 2020-07-09 DIAGNOSIS — R55 VASOVAGAL NEAR SYNCOPE: ICD-10-CM

## 2020-07-09 PROCEDURE — 3008F PR BODY MASS INDEX (BMI) DOCUMENTED: ICD-10-PCS | Mod: CPTII,S$GLB,, | Performed by: PEDIATRICS

## 2020-07-09 PROCEDURE — 93227: ICD-10-PCS | Mod: S$GLB,,, | Performed by: PEDIATRICS

## 2020-07-09 PROCEDURE — 93000 ELECTROCARDIOGRAM COMPLETE: CPT | Mod: S$GLB,,, | Performed by: PEDIATRICS

## 2020-07-09 PROCEDURE — 99214 OFFICE O/P EST MOD 30 MIN: CPT | Mod: 25,S$GLB,, | Performed by: PEDIATRICS

## 2020-07-09 PROCEDURE — 93000 EKG 12-LEAD: ICD-10-PCS | Mod: S$GLB,,, | Performed by: PEDIATRICS

## 2020-07-09 PROCEDURE — 99999 PR PBB SHADOW E&M-EST. PATIENT-LVL III: ICD-10-PCS | Mod: PBBFAC,,, | Performed by: PEDIATRICS

## 2020-07-09 PROCEDURE — 99999 PR PBB SHADOW E&M-EST. PATIENT-LVL III: CPT | Mod: PBBFAC,,, | Performed by: PEDIATRICS

## 2020-07-09 PROCEDURE — 93227 XTRNL ECG REC<48 HR R&I: CPT | Mod: S$GLB,,, | Performed by: PEDIATRICS

## 2020-07-09 PROCEDURE — 3008F BODY MASS INDEX DOCD: CPT | Mod: CPTII,S$GLB,, | Performed by: PEDIATRICS

## 2020-07-09 PROCEDURE — 99214 PR OFFICE/OUTPT VISIT, EST, LEVL IV, 30-39 MIN: ICD-10-PCS | Mod: 25,S$GLB,, | Performed by: PEDIATRICS

## 2020-07-09 NOTE — PROGRESS NOTES
2020    re:Ryan Sigala  :1995    Connie Mckeon MD  [address] [facility] [address]    Dr. Kaya Jimenez - [facility]    Ryan Sigala is a 24 y.o. male seen today for his initial visit in the [facility] for evaluation of his complex congenital heart disease.  To summarize, his diagnoses are as follows:  1.  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  2.  Significant neoaortic valve insufficiency and neoaortic root enlargement status post repeat resection of sub-neoaortic valve obstruction and aortic root plication in  at [facility] by Dr. Willard.   - Trivial neoaortic valve insufficiency and trivial stenosis   - Significant, likely stable aortic root enlargement  3.  Likely mild branch pulmonary artery stenosis without evidence of significant obstruction.  4.  Recent episode of near-syncope, likely vasovagal.    To summarize, my plan is as follows:  1.  24 hr Holter.  2.  Order echo and cardiac MRI  3.  I would recommend against heavy lifting and contact sports.  Regular light exercise is encouraged.  4.  As per guidelines, he does not require endocarditis prophylaxis before procedures.  However, I have no problems with them continuing this practice.  Regular dental care and good dental hygiene is necessary.  5.  Regular long-term care in adult congenital heart disease clinic with close monitoring for hypertension.  6.  After the MRI, I will send studies up to Dr. Jimenez in [place] for his review.  7.  Increase intake of non caffeinated fluid.  Sit down and elevate legs if  dizziness develops.  Do not skip meals.    Discussion:  I highly doubt a cardiac etiology for his recent episode.  I suspect he had vasovagal near-syncope due to dehydration, exacerbated by his alcohol intake the night before and inadequate fluid intake the day of the episode.  Although he is at risk for arrhythmias, I think it is unlikely that an arrhythmia cause the symptoms.  We will get a Holter.    He has significant aortic root enlargement which is common in transposition patients and bicuspid aortic valve patients.  At last check, it had been stable for over 8 years.  We will get a repeat echocardiogram and cardiac MRI to better assess.  In the past, we have discussed starting losartan or a beta-blocker, but the patient is resistant to do this unless we assured is indicated.    History of present illness:  It has been over a year and half since he was last seen in this clinic.  Two weeks ago, he had an episode of near-syncope.  The day before, he had cut the grass in the heat.  He then went out drinking beers with his friends.  He had some water before he went to bed.  The next day, he was playing video games at about 2:00 p.m..  He had not had anything to drink that day.  While he was playing, he felt dizzy.  He felt weak.  He then started to feel some tightening in his chest.  He points to an area just under the left breast.  The tightness did not radiate.  He stood up, and he felt like he was going to pass out.  I could feel the blood draining from my face.  He walked downstairs.  His mother had him sit on a recliner.  He immediately felt better when his feet were elevated.  He then drank some water and continued to feel better.  The symptoms lasted, in total, about 10-15 minutes.  After his symptoms started, he started to panic.  He then felt like his heart was racing, but the racing heartbeat did not start until after his other symptoms.    Otherwise, he has done well.  No dyspnea on exertion.  No  syncope.  No edema.  He is currently in graduate school.  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  He hopes to graduate next year.    The review of systems is as noted above. It is otherwise negative for other symptoms related to the general, neurological, psychiatric, endocrine, gastrointestinal, genitourinary, respiratory, dermatologic, musculoskeletal, hematologic, and immunologic systems.    Past Medical History:   Diagnosis Date    Congenital heart disease     D-TGA, VSD, subpulmonary stenosis     Past Surgical History:   Procedure Laterality Date    BT shunt      left thoracotomy, [facility], 3 days of age    CARDIAC SURGERY      Arterial switch, VSD closure, subPS resection at [facility] at 21 months of age    CARDIAC SURGERY  2008    subaortic stenosis resection, aortic root plication     Family History   Problem Relation Age of Onset    Lung cancer Father     Congenital heart disease Neg Hx     Early death Neg Hx     Long QT syndrome Neg Hx     SIDS Neg Hx      Social History     Socioeconomic History    Marital status: Single     Spouse name: Not on file    Number of children: Not on file    Years of education: Not on file    Highest education level: Not on file   Occupational History    Not on file   Social Needs    Financial resource strain: Not on file    Food insecurity     Worry: Not on file     Inability: Not on file    Transportation needs     Medical: Not on file     Non-medical: Not on file   Tobacco Use    Smoking status: Never Smoker    Smokeless tobacco: Never Used   Substance and Sexual Activity    Alcohol use: No    Drug use: Never    Sexual activity: Not on file   Lifestyle    Physical activity     Days per week: Not on file     Minutes per session: Not on file    Stress: Not on file   Relationships    Social connections     Talks on phone: Not on file     Gets together: Not on file     Attends [employer] service: Not on file     Active member of club or  "organization: Not on file     Attends meetings of clubs or organizations: Not on file     Relationship status: Not on file   Other Topics Concern    Not on file   Social History Narrative    Graduated from [employer] with degree in psychology.  Working on getting employment.     No current outpatient medications on file prior to visit.     No current facility-administered medications on file prior to visit.      Review of patient's allergies indicates:  No Known Allergies    Vitals:    07/09/20 0840 07/09/20 0842   BP: 108/65 115/72   BP Location: Right arm Left arm   Patient Position: Sitting Sitting   BP Method: Large (Automatic) Large (Automatic)   Pulse: 79 79   SpO2: 98%    Weight: 74.8 kg (164 lb 14.5 oz)    Height: 5' 11" (1.803 m)      In general, he is a very healthy-appearing nondysmorphic male in no apparent distress.  The eyes, nares, and oropharynx are clear.  Eyelids and conjunctiva are normal without drainage or erythema.  Pupils equal and round bilaterally.  The head is normocephalic and atraumatic.  The neck is supple without jugular venous distention or thyroid enlargement.  The lungs are clear to auscultation bilaterally.  There is a well-healed median sternotomy scar and a well-healed left thoracotomy scar.  The first and second heart sounds are normal.  There are no gallops, rubs, or clicks in the seated position.  He does have a grade 2/6 systolic ejection murmur heard best at the upper left and right sternal borders.  The murmur radiates to his neck.  The abdominal exam is benign without hepatosplenomegaly, tenderness, or distention.  Pulses are normal in all 4 extremities with brisk capillary refill and no clubbing, cyanosis, or edema.  No rashes are noted.    An EKG was done today.  Left axis deviation is noted.  Otherwise, it is normal.  The EKG is unchanged.    A CT scan 8/2017 reveals:  Overview:     - Situs solitus with two atria; two ventricles in D-loop with AV concordant; two great arteries " in typical configuration for postoperative Jatene procedure for correction of D-TGA.      - LEFT aortic arch.      - Leftward cardiac apex and left-sided stomach.    - 6 sternal sutures, 2 in the manubrium and 4 in the body of the sternum.  Findings:  Neck:  There are multiple subcentimeter low attenuation lesions in both lobes of the thyroid gland.  Clinical considerations will determine if ultrasound assessment for further characterization is warranted.  I detect no other abnormality at the base of the neck.  Aorta and coronary arteries:  There is left-sided arch with 3 branch vessels.  Internal mammary arteries arise in normal fashion.  There is marked enlargement of the aortic root and proximal ascending aorta.  Details of the thoracic aorta:      - On coronal reconstruction the sinotubular junction measures 6.0 cm.      - The proximal ascending aorta measures 5.7 cm.      - The mid ascending aorta measures 5.5 cm.      - The distal ascending aorta, just proximal to the inferior margin of the LEFT innominate vein, measures 3.0 cm.    - The arch measures 2.2 cm between the LEFT common carotid and LEFT subclavian arteries.      - Isthmus including the ductus bump measures 2.7 cm.      - Distal descending aorta maintains normal caliber, contour and course.    The site of the Jatene anastomosis may lie between the mid and distal ascending aorta where there is a marked change in the caliber of the ascending aorta as described above.  Please note that the measurements provided above were obtained on non-gated CTA in levophase.  If warranted, repeat evaluation using gated TAVR protocol might provide more accurate measurements; if assessment of proximal coronary arteries is also desired on TAVR study, consider inclusion of beta-blocker administration to reduce the heart rate to something on the order of 60 bpm.     Coronary arteries:        -- LCA arises from the anterior wall of the ascending aorta to the left of  midline, near the posterior and left margin of the anteriorly located mPA following Jatene procedure.  LCA maintains normal caliber, contour and course, dividing into LAD and Cx with typical branches.  The LAD is a small vessel, running to the left of the IV groove.        -- The RCA arises from the RIGHT lateral wall of the ascending aorta anterior to the RSVC and cephalad to the right atrial appendage.  It has a slender lumen as it courses along the lateral wall of the AAo and over the RAA.  It may have a slit-like orifice.  The RCA is markedly dominant.      As noted above, gated TAVR study is contemplated and assessment of proximal coronary arteries is also desired on that study, consider inclusion of beta-blocker administration to reduce the heart rate to something on the order of 60 bpm.     Aortic origin:       Following Jatene procedure the aorta arises from the LV outflow tract with the VSD patch material noted at the RIGHT and inferior margin of the LVOT as described below.  Ventricles, AV and VA connections; VSD patch:    - 2 ventricles are present in typical D. loop.  Interventricular septum maintains normal rounded contour are going for normal LEFT and RIGHT ventricular pressures.    - Atrioventricular connections are concordant.    - VSD patch material runs along the right wall of the LVOT extending inferiorly to the perimenbranous location near the implantation of the septal leaflet of the tricuspid valve. The VSD patch is remote from the septal leaflet of the mitral valve with 1.7 cm of atrioventricular septum between the septal TcV and MV leaflets  the subaortic portion of the LV chamber from the RA chamber (see axial series 2 image 164).    - There appears to be thinning of the anterior portion of the wall in the proximal RV outflow tract (axial series 2 images 136 through 140; sagittal series 202 image 99), located at the level of the fifth of 6 sternal sutures.    - RV outflow tract  supports the pulmonary artery.  CT neither confirms nor excludes pulmonary valve tissue although absence of RV enlargement argues against free pulmonary insufficiency.  Pulmonary arteries:    - Pulmonary trunk lies in the midline, closely related to the posterior table of the sternum with minimal fat interposed between the sternum and the pulmonary trunk at the levels of the second through fourth sternal wires.    - Pulmonary trunk divides divides in typical fashion for Jatene procedure with LEFT and RIGHT pulmonary arteries draped over the ascending aorta.    - I detect no stenosis in the pulmonary artery distribution.  Hemostasis clip is present at the superior margin of the LEFT pulmonary artery.  Systemic veno atrial connections:  Concordant    - Systemic veins return in typical concordant fashion via the RIGHT SVC, azygos vein to the SVC, and IVC to the RIGHT atrium.  Pulmonary venoatrial connections:  Concordant    - There are 5 pulmonary veins (LSPV, LIPV, RIPV, venous branch fr superior segment RLL, RSPV) that return in typical fashion to the LA.      - The venous branch draining the superior segment of the right lower lobe enters the LA at the superior margin of the RIPV (see coronal series 201 image 69).  Pleura, pericardium, mediastinum, ayo, LNs, esophagus:    - There is no pleural or pericardial fluid, no pleural or pericardial calcification and no lymph node enlargement.  The mediastinal fat appears unremarkable.    - Esophagus maintains normal caliber and course.    Abdomen:    - I detect no bowel distension, free air, biliary dilatation or other significant abnormality involving structures in the upper abdomen.      - Prompt bilateral nephrograms are present with medullary enhancement; renal collecting systems are not included on the study.  Chest wall and extrathoracic soft tissues:    - There are 6 intact sternal sutures, 2 of which lie in the manubrium.  Sternal fragments are fused.    - There is  no segmentation anomaly of ribs or vertebral bodies.    - I detect no significant abnormality of the extrathoracic soft tissues.   Airways and lungs:    - Airways distribute normally.  I detect no endobronchial lesion.    - Lungs are clear.  No pulmonary disease detected.   Impression     1.  21-year-old patient status post Jatene procedure for correction of transposition of the great arteries has marked dilatation of the aortic root and mid ascending aorta with measurements provided above.  2.  There are 2 coronary arteries.  Markedly dominant RIGHT coronary artery appears narrowed as it courses around the dilated aortic root to achieve the atrioventricular septum; it may have a slitlike orifice.  Please see above for discussion of the role of gated TAVR protocol for assessment of the aortic root, and the role of beta blockers in gated TAVR for assessment of proximal coronary arteries.   3.  Additional findings above.       Thank you for referring this patient to our clinic.  Please call with any questions.    Sincerely,        Darren Valladares MD  Pediatric Cardiology  Adult Congenital Heart Disease  Pediatric Heart Failure and Transplantation  [facility] [address] [place]  [address]  [phone]

## 2020-07-21 LAB
OHS CV EVENT MONITOR DAY: 1
OHS CV HOLTER LENGTH DECIMAL HOURS: 24
OHS CV HOLTER LENGTH HOURS: 0
OHS CV HOLTER LENGTH MINUTES: 0

## 2020-08-12 ENCOUNTER — HOSPITAL ENCOUNTER (OUTPATIENT)
Dept: CARDIOLOGY | Facility: HOSPITAL | Age: 25
Discharge: HOME | End: 2020-08-12
Attending: PEDIATRICS
Payer: COMMERCIAL

## 2020-08-12 ENCOUNTER — HOSPITAL ENCOUNTER (OUTPATIENT)
Dept: RADIOLOGY | Facility: HOSPITAL | Age: 25
Discharge: HOME | End: 2020-08-12
Attending: PEDIATRICS
Payer: COMMERCIAL

## 2020-08-12 VITALS — HEIGHT: 71 IN | WEIGHT: 164 LBS | HEART RATE: 98 BPM | BODY MASS INDEX: 22.96 KG/M2

## 2020-08-12 DIAGNOSIS — I77.810 AORTIC ROOT DILATION: ICD-10-CM

## 2020-08-12 DIAGNOSIS — Q20.3 TRANSPOSITION OF THE GREAT ARTERIES, ISOLATED (SDD): ICD-10-CM

## 2020-08-12 DIAGNOSIS — Q25.6 PULMONARY ARTERY STENOSIS: ICD-10-CM

## 2020-08-12 DIAGNOSIS — Q20.3 TRANSPOSITION OF THE GREAT ARTERIES: ICD-10-CM

## 2020-08-12 DIAGNOSIS — Q23.1 BICUSPID AORTIC VALVE: ICD-10-CM

## 2020-08-12 DIAGNOSIS — Q24.9 ADULT CONGENITAL HEART DISEASE: ICD-10-CM

## 2020-08-12 DIAGNOSIS — Q24.9 CONGENITAL MALFORMATION OF HEART, UNSPECIFIED: ICD-10-CM

## 2020-08-12 DIAGNOSIS — Z87.74 S/P VSD REPAIR: ICD-10-CM

## 2020-08-12 LAB
ASCENDING AORTA: 3.3 CM
AV INDEX (PROSTH): 0.3
AV MEAN GRADIENT: 15 MMHG
AV PEAK GRADIENT: 20 MMHG
AV VALVE AREA: 1.27 CM2
AV VELOCITY RATIO: 0.36
BSA FOR ECHO PROCEDURE: 1.93 M2
CV ECHO LV RWT: 0.36 CM
DOP CALC AO PEAK VEL: 2.25 M/S
DOP CALC AO VTI: 43.72 CM
DOP CALC LVOT AREA: 4.2 CM2
DOP CALC LVOT DIAMETER: 2.32 CM
DOP CALC LVOT PEAK VEL: 0.81 M/S
DOP CALC LVOT STROKE VOLUME: 55.56 CM3
DOP CALCLVOT PEAK VEL VTI: 13.15 CM
E WAVE DECELERATION TIME: 165.87 MSEC
E/A RATIO: 1.15
E/E' RATIO: 4.24 M/S
ECHO LV POSTERIOR WALL: 0.81 CM (ref 0.6–1.1)
FRACTIONAL SHORTENING: 37 % (ref 28–44)
INTERVENTRICULAR SEPTUM: 0.94 CM (ref 0.6–1.1)
LA MAJOR: 3.69 CM
LA MINOR: 3.63 CM
LA WIDTH: 3.83 CM
LEFT ATRIUM SIZE: 2.97 CM
LEFT ATRIUM VOLUME INDEX: 18.3 ML/M2
LEFT ATRIUM VOLUME: 35.39 CM3
LEFT INTERNAL DIMENSION IN SYSTOLE: 2.81 CM (ref 2.1–4)
LEFT VENTRICLE DIASTOLIC VOLUME INDEX: 47.47 ML/M2
LEFT VENTRICLE DIASTOLIC VOLUME: 92.02 ML
LEFT VENTRICLE MASS INDEX: 66 G/M2
LEFT VENTRICLE SYSTOLIC VOLUME INDEX: 15.3 ML/M2
LEFT VENTRICLE SYSTOLIC VOLUME: 29.75 ML
LEFT VENTRICULAR INTERNAL DIMENSION IN DIASTOLE: 4.49 CM (ref 3.5–6)
LEFT VENTRICULAR MASS: 127.44 G
LV LATERAL E/E' RATIO: 3.12 M/S
LV SEPTAL E/E' RATIO: 6.63 M/S
MV PEAK A VEL: 0.46 M/S
MV PEAK E VEL: 0.53 M/S
MV STENOSIS PRESSURE HALF TIME: 48.1 MS
MV VALVE AREA P 1/2 METHOD: 4.57 CM2
PISA TR MAX VEL: 2.44 M/S
RA MAJOR: 3.89 CM
RA WIDTH: 3.7 CM
RIGHT VENTRICULAR END-DIASTOLIC DIMENSION: 4.13 CM
RV TISSUE DOPPLER FREE WALL SYSTOLIC VELOCITY 1 (APICAL 4 CHAMBER VIEW): 7.67 CM/S
SINUS: 4.96 CM
STJ: 3.55 CM
TDI LATERAL: 0.17 M/S
TDI SEPTAL: 0.08 M/S
TDI: 0.13 M/S
TR MAX PG: 24 MMHG
TRICUSPID ANNULAR PLANE SYSTOLIC EXCURSION: 1.48 CM

## 2020-08-12 PROCEDURE — 93320 DOPPLER ECHO COMPLETE: CPT | Mod: 26,,, | Performed by: PEDIATRICS

## 2020-08-12 PROCEDURE — 25500020 PHARM REV CODE 255: Performed by: PEDIATRICS

## 2020-08-12 PROCEDURE — 93325 ECHO (CUPID ONLY): ICD-10-PCS | Mod: 26,,, | Performed by: PEDIATRICS

## 2020-08-12 PROCEDURE — 75561 MRI CARDIAC MORPHOLOGY FUNCTION W WO: ICD-10-PCS | Mod: 26,,, | Performed by: RADIOLOGY

## 2020-08-12 PROCEDURE — 75561 CARDIAC MRI FOR MORPH W/DYE: CPT | Mod: 26,,, | Performed by: RADIOLOGY

## 2020-08-12 PROCEDURE — 93303 ECHO TRANSTHORACIC: CPT | Mod: 26,,, | Performed by: PEDIATRICS

## 2020-08-12 PROCEDURE — 93325 DOPPLER ECHO COLOR FLOW MAPG: CPT | Mod: 26,,, | Performed by: PEDIATRICS

## 2020-08-12 PROCEDURE — 93306 TTE W/DOPPLER COMPLETE: CPT

## 2020-08-12 PROCEDURE — 93320 ECHO (CUPID ONLY): ICD-10-PCS | Mod: 26,,, | Performed by: PEDIATRICS

## 2020-08-12 PROCEDURE — 75561 CARDIAC MRI FOR MORPH W/DYE: CPT | Mod: TC

## 2020-08-12 PROCEDURE — A9577 INJ MULTIHANCE: HCPCS | Performed by: PEDIATRICS

## 2020-08-12 PROCEDURE — 93325 DOPPLER ECHO COLOR FLOW MAPG: CPT

## 2020-08-12 PROCEDURE — 93303 ECHO (CUPID ONLY): ICD-10-PCS | Mod: 26,,, | Performed by: PEDIATRICS

## 2020-08-12 RX ADMIN — GADOBENATE DIMEGLUMINE 34 ML: 529 INJECTION, SOLUTION INTRAVENOUS at 01:08

## 2020-10-06 ENCOUNTER — TELEPHONE (OUTPATIENT)
Dept: PEDIATRIC CARDIOLOGY | Facility: CLINIC | Age: 25
End: 2020-10-06

## 2020-10-06 ENCOUNTER — PATIENT MESSAGE (OUTPATIENT)
Dept: CARDIOLOGY | Facility: CLINIC | Age: 25
End: 2020-10-06

## 2020-10-06 NOTE — TELEPHONE ENCOUNTER
Message left on the patient's phone, and I will send him a patient portal message as well:    There has not been much change with your cardiac MRI compared to the previous CT scans.  Everything looks great except for your aortic root, the 1st part of the big blood vessel that comes off of your heart and goes tear body.  As is very common in patients with transposition of the great arteries who have had an arterial switch operation, that area is enlarged.  The aortic valve works very well, which is often not the case, but the aortic root and ascending aorta measure about 6.3 cm (normal likely around 3cm.  The concern with an aortic root that is significantly enlarged is the risk of rupture.  We have very little data about this in patients like you.  Enlarged aortic roots are common in patients with Marfan syndrome or bicuspid aortic valve, and we have great data with patient's like that, but that does not directly apply to you.  The most recent [place] guidelines which came out in 2018 basically state that we do not have enough data to make recommendations.  However, the  guidelines that just came out this year recommend at least considering intervention when the aortic root is greater than 5.5 cm.    I would recommend we get you in to clinic to meet with me and with Dr. Mireles to discuss this more.  He is our heart surgeon.  With that work for you?  We would not need any more testing.

## 2020-10-13 ENCOUNTER — TELEPHONE (OUTPATIENT)
Dept: PEDIATRIC CARDIOLOGY | Facility: CLINIC | Age: 25
End: 2020-10-13

## 2020-10-13 NOTE — TELEPHONE ENCOUNTER
I had not heard from the patient, so I called him back.  We discussed the following:  There has not been much change with your cardiac MRI compared to the previous CT scans.  Everything looks great except for your aortic root, the 1st part of the big blood vessel that comes off of your heart and goes tear body.  As is very common in patients with transposition of the great arteries who have had an arterial switch operation, that area is enlarged.  The aortic valve works very well, which is often not the case, but the aortic root and ascending aorta measure about 6.3 cm (normal likely around 3cm.  The concern with an aortic root that is significantly enlarged is the risk of rupture.  We have very little data about this in patients like you.  Enlarged aortic roots are common in patients with Marfan syndrome or bicuspid aortic valve, and we have great data with patients like that, but that does not directly apply to you.  The most recent [place] guidelines which came out in 2018 basically state that we do not have enough data to make recommendations.  However, the    guidelines that just came out this year recommend at least considering intervention when the aortic root is greater than 5.5 cm.     I would like to get the patient and his mother in to clinic to meet with me and our surgery team.  We will get that scheduled.

## 2020-10-29 ENCOUNTER — OFFICE VISIT (OUTPATIENT)
Dept: CARDIOLOGY | Facility: CLINIC | Age: 25
End: 2020-10-29
Payer: COMMERCIAL

## 2020-10-29 ENCOUNTER — INITIAL CONSULT (OUTPATIENT)
Dept: VASCULAR SURGERY | Facility: CLINIC | Age: 25
End: 2020-10-29
Payer: COMMERCIAL

## 2020-10-29 VITALS
HEART RATE: 80 BPM | HEIGHT: 71 IN | HEART RATE: 80 BPM | WEIGHT: 170.88 LBS | BODY MASS INDEX: 23.92 KG/M2 | DIASTOLIC BLOOD PRESSURE: 73 MMHG | WEIGHT: 170.88 LBS | OXYGEN SATURATION: 99 % | BODY MASS INDEX: 23.92 KG/M2 | HEIGHT: 71 IN | DIASTOLIC BLOOD PRESSURE: 73 MMHG | SYSTOLIC BLOOD PRESSURE: 119 MMHG | SYSTOLIC BLOOD PRESSURE: 119 MMHG | OXYGEN SATURATION: 99 %

## 2020-10-29 DIAGNOSIS — I77.810 AORTIC ROOT DILATION: ICD-10-CM

## 2020-10-29 DIAGNOSIS — Q25.6 PULMONARY ARTERY STENOSIS: ICD-10-CM

## 2020-10-29 DIAGNOSIS — Z87.74 S/P VSD REPAIR: ICD-10-CM

## 2020-10-29 DIAGNOSIS — Q20.3 TRANSPOSITION OF THE GREAT ARTERIES, ISOLATED (SDD): Primary | ICD-10-CM

## 2020-10-29 DIAGNOSIS — Q24.9 ADULT CONGENITAL HEART DISEASE: ICD-10-CM

## 2020-10-29 DIAGNOSIS — Q23.1 BICUSPID AORTIC VALVE: ICD-10-CM

## 2020-10-29 PROCEDURE — 99999 PR PBB SHADOW E&M-EST. PATIENT-LVL III: CPT | Mod: PBBFAC,,, | Performed by: PHYSICIAN ASSISTANT

## 2020-10-29 PROCEDURE — 99203 OFFICE O/P NEW LOW 30 MIN: CPT | Mod: S$GLB,,, | Performed by: PHYSICIAN ASSISTANT

## 2020-10-29 PROCEDURE — 3008F BODY MASS INDEX DOCD: CPT | Mod: CPTII,S$GLB,, | Performed by: PHYSICIAN ASSISTANT

## 2020-10-29 PROCEDURE — 99213 PR OFFICE/OUTPT VISIT, EST, LEVL III, 20-29 MIN: ICD-10-PCS | Mod: S$GLB,,, | Performed by: PEDIATRICS

## 2020-10-29 PROCEDURE — 3008F BODY MASS INDEX DOCD: CPT | Mod: CPTII,S$GLB,, | Performed by: PEDIATRICS

## 2020-10-29 PROCEDURE — 99203 PR OFFICE/OUTPT VISIT, NEW, LEVL III, 30-44 MIN: ICD-10-PCS | Mod: S$GLB,,, | Performed by: PHYSICIAN ASSISTANT

## 2020-10-29 PROCEDURE — 3008F PR BODY MASS INDEX (BMI) DOCUMENTED: ICD-10-PCS | Mod: CPTII,S$GLB,, | Performed by: PHYSICIAN ASSISTANT

## 2020-10-29 PROCEDURE — 99999 PR PBB SHADOW E&M-EST. PATIENT-LVL III: CPT | Mod: PBBFAC,,, | Performed by: PEDIATRICS

## 2020-10-29 PROCEDURE — 99213 OFFICE O/P EST LOW 20 MIN: CPT | Mod: S$GLB,,, | Performed by: PEDIATRICS

## 2020-10-29 PROCEDURE — 3008F PR BODY MASS INDEX (BMI) DOCUMENTED: ICD-10-PCS | Mod: CPTII,S$GLB,, | Performed by: PEDIATRICS

## 2020-10-29 PROCEDURE — 99999 PR PBB SHADOW E&M-EST. PATIENT-LVL III: ICD-10-PCS | Mod: PBBFAC,,, | Performed by: PHYSICIAN ASSISTANT

## 2020-10-29 PROCEDURE — 99999 PR PBB SHADOW E&M-EST. PATIENT-LVL III: ICD-10-PCS | Mod: PBBFAC,,, | Performed by: PEDIATRICS

## 2020-10-29 NOTE — LETTER
November 4, 2020      Darren Valladares MD  [address]  [facility] [address]           [facility]  [address]  [facility] [address]  Phone: [phone]  Fax: [phone]          Patient: Ryan Sigala   MR Number: [id number]   YOB: 1995   Date of Visit: 10/29/2020       Dear Dr. Darren Valladares:    Thank you for referring Ryan Sigala to me for evaluation. Attached you will find relevant portions of my assessment and plan of care.    If you have questions, please do not hesitate to call me. I look forward to following Ryan Sigala along with you.    Sincerely,    Ana Laura Russell PA-C    Enclosure  CC:  No Recipients    If you would like to receive this communication electronically, please contact [email or web address] or [phone] to request more information on [employer] access.    For providers and/or their staff who would like to refer a patient to [facility], please contact us through our one-stop-shop provider referral line, [facility], at [phone].    If you feel you have received this communication in error or would no longer like to receive these types of communications, please e-mail [email or web address]

## 2020-10-29 NOTE — PROGRESS NOTES
10/29/2020    re:Ryan Sigala  :1995    Connie Mckeon MD  [address] [address]    Dr. Kaya Jimenez - [facility]    Ryan Sigala is a 24 y.o. male seen today for his initial visit in the [facility] for evaluation of his complex congenital heart disease.  To summarize, his diagnoses are as follows:  1.  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  2.  Significant neoaortic valve insufficiency and neoaortic root enlargement status post repeat resection of sub-neoaortic valve obstruction and aortic root plication in  at [facility] by Dr. Willard.   - Mild neoaortic valve insufficiency and trivial stenosis   - Significant, likely stable aortic root enlargement  3.  Likely mild branch pulmonary artery stenosis without evidence of significant obstruction.    To summarize, my plan is as follows:  1.  I would recommend against heavy lifting and contact sports.  Regular light exercise is encouraged.  2.  As per guidelines, he does not require endocarditis prophylaxis before procedures.  However, I have no problems with them continuing this practice.  Regular dental care and good dental hygiene is necessary.  3.  I will attempt to get more records from the team at [facility].  I am going to send them his recent studies as well to get their input.  4.  For now, would recommend conservative management with repeat cardiac MRI and echocardiogram 2021.    Discussion:  His cardiac MRI suggested some enlargement of his aortic root.  I saw him  today with our surgeons.  We reviewed his CT scans and the MRI.  I really do not think there has been any growth of the aortic root.  He has already had surgery on his aorta.  The valve is working very well.  At present, we are going to continue to monitor him.  We will repeat an MRI in a year.  I am going to send all of this data to [place] to see what they think.    History of present illness:  We had him come to clinic today to meet with our surgeons.  Patient has been doing well.  No chest pain.  No shortness of breath.  No palpitations.  He occasionally gets dizzy when he stands up quickly.  No syncope.  No cyanosis or edema.    The review of systems is as noted above. It is otherwise negative for other symptoms related to the general, neurological, psychiatric, endocrine, gastrointestinal, genitourinary, respiratory, dermatologic, musculoskeletal, hematologic, and immunologic systems.    Past Medical History:   Diagnosis Date    Congenital heart disease     D-TGA, VSD, subpulmonary stenosis     Past Surgical History:   Procedure Laterality Date    BT shunt      left thoracotomy, [facility], 3 days of age    CARDIAC SURGERY      Arterial switch, VSD closure, subPS resection at [facility] at 21 months of age    CARDIAC SURGERY  2008    subaortic stenosis resection, aortic root plication     Family History   Problem Relation Age of Onset    Lung cancer Father     Congenital heart disease Neg Hx     Early death Neg Hx     Long QT syndrome Neg Hx     SIDS Neg Hx      Social History     Socioeconomic History    Marital status: Single     Spouse name: Not on file    Number of children: Not on file    Years of education: Not on file    Highest education level: Not on file   Occupational History    Not on file   Social Needs    Financial resource strain: Not on file    Food insecurity     Worry: Not on file     Inability: Not on file    Transportation needs     Medical: Not on file     Non-medical:  "Not on file   Tobacco Use    Smoking status: Never Smoker    Smokeless tobacco: Never Used   Substance and Sexual Activity    Alcohol use: No    Drug use: Never    Sexual activity: Not on file   Lifestyle    Physical activity     Days per week: Not on file     Minutes per session: Not on file    Stress: Not on file   Relationships    Social connections     Talks on phone: Not on file     Gets together: Not on file     Attends Muslim service: Not on file     Active member of club or organization: Not on file     Attends meetings of clubs or organizations: Not on file     Relationship status: Not on file   Other Topics Concern    Not on file   Social History Narrative    Graduated from [employer] with degree in psychology.  Working on getting employment.     No current outpatient medications on file prior to visit.     No current facility-administered medications on file prior to visit.      Review of patient's allergies indicates:  No Known Allergies    /73 (BP Location: Left arm, Patient Position: Sitting, BP Method: Large (Automatic))   Pulse 80   Ht 5' 11" (1.803 m)   Wt 77.5 kg (170 lb 13.7 oz)   SpO2 99%   BMI 23.83 kg/m²   In general, he is a very healthy-appearing nondysmorphic male in no apparent distress.  The eyes, nares, and oropharynx are clear.  Eyelids and conjunctiva are normal without drainage or erythema.  Pupils equal and round bilaterally.  The head is normocephalic and atraumatic.  The neck is supple without jugular venous distention or thyroid enlargement.  The lungs are clear to auscultation bilaterally.  There is a well-healed median sternotomy scar and a well-healed left thoracotomy scar.  The first and second heart sounds are normal.  There are no gallops, rubs, or clicks in the seated position.  He does have a grade 2/6 systolic ejection murmur heard best at the upper left and right sternal borders.  The murmur radiates to his neck.  The abdominal exam is benign without " hepatosplenomegaly, tenderness, or distention.  Pulses are normal in all 4 extremities with brisk capillary refill and no clubbing, cyanosis, or edema.  No rashes are noted.    Echo 8/12/20:  S/P arterial switch for d-transposition-  Right ventricle appears normal in size and structure with qualitati not demonstrated.  vely good right ventricular systolic function.  Right ventricular systolic pressure estimated 24 mm Hg plus right atrial pressure.  Echodensity consistent with patch repair of ventricular septal defect and no residual shunt demonstrated.  Pulmonary arteries arise from anteriorly positioned pulmonary valve and pass to opposite sides of the aorta status post Ana maneuver.  Right pulmonary artery suggest no significant stenosis.    Limited imaging of proximal left pulmonary artery with minimal acceleration to peak velocity <1.8 m/sec passing to the left of the aorta  The left atrium appears normal in size.  There is mild discrete jet of mitral insufficiency.  Left ventricle appears normal in size and structure.  There is paradoxical septal motion with good movement of the left ventricular free wall, shortening fraction measured 39% and ejection fraction estimated 50 -55% from apical views.  Normal indices of left ventricular diastolic function.  There is no obvious subaortic obstruction in the LV outflow (25 mm diameter).  Bicuspid neoaortic valve with mild acceleration to peak velocity <2.3 m/sec and mean gradient < 15 mm Hg.  There is a trivial jet of janelle aortic insufficiency.  Aortic dimensions:  Sinuses of Valsalva = 50 mm.  ST junction             = 36 mm.  Ascending aorta     = 33 mm.    Cardiac MRI 8/12/20:  1. Normal right ventricular volumes. RVEF 56 %.  2. Pulmonary insufficiency, regurgitant fraction 5%, regurgitant volume 3 ml.    3. The supravlavar RVOT is narrowed in the AP direction, but normally sized from a left-right orientation.   4. The pulmonary arteries are draped anterior to  the aorta s/p Camargo maneuver. Symmetric branch pulmonary arteries with mild hypoplasia and no evidence of discrete stenosis.   5. Normal left ventricular volumes with LVEF 53%. There is significant dyskinesis in the region of the VSD patch. The remainder of the ventricle has normal appearing wall motion.  6. Bicuspid janelle-aortic valve with normally sized annulus, doming leaflets. Trivial-mild aortic insufficiency, regurgitant fraction 14%, regurgitant volume 12 ml.   7. Severely enlarged aortic root at the sinuses of Valsalva (63mm) with effacement of the ST junction (63mm) and significant dilated of the ascending aorta just inferior to the branch PAs (64mm). The right side of the ascending aorta is asymmetrically dilated/aneurysmal from the LVOT jet.     Thank you for referring this patient to our clinic.  Please call with any questions.    Sincerely,        Darren Valladares MD  Pediatric Cardiology  Adult Congenital Heart Disease  Pediatric Heart Failure and Transplantation  [facility] [address] [place]  [address]  [phone]

## 2020-11-02 ENCOUNTER — PATIENT MESSAGE (OUTPATIENT)
Dept: PEDIATRIC CARDIOLOGY | Facility: CLINIC | Age: 25
End: 2020-11-02

## 2020-11-04 NOTE — PROGRESS NOTES
Subjective:      Patient: Ryan Sigala, MRN: [id number]  Requesting Physician:  Dr. Darren Valladares     Chief Complaint   Patient presents with    Heart Problem     dilated aortic root, h/o d-TGA, VSD h/o arterila switch/VSD repair       Surgical CONSULT/EVALUATION: Patient presents for surgical consultation    Diagnosis:    TGA    HPI:   Ryan Sigala is a 24 y.o. male seen today with complex congenital heart disease.  To summarize, his diagnoses are as follows:  1.  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  2.  Significant neoaortic valve insufficiency and neoaortic root enlargement status post repeat resection of sub-neoaortic valve obstruction and aortic root plication in  at [facility] by Dr. Willard.              - Mild neoaortic valve insufficiency and trivial stenosis              - Significant, likely stable aortic root enlargement  3.  Likely mild branch pulmonary artery stenosis without evidence of significant obstruction.    His aortic root is enlarged but stable.     ROS  Negative unless listed in HPI    History:    Past Medical History:   Diagnosis Date    Congenital heart disease     D-TGA, VSD, subpulmonary stenosis    TGA/VSD (transposition of great arteries, ventricular septal defect) 1994       Past Surgical History:   Procedure Laterality Date    BT shunt      left thoracotomy, [facility], 3 days of age    CARDIAC SURGERY      Arterial switch, VSD closure, subPS resection at [facility] at 21 months of age    CARDIAC SURGERY  2008    subaortic stenosis resection, aortic root plication       Family History   Problem Relation Age of Onset    Lung  cancer Father     Congenital heart disease Neg Hx     Early death Neg Hx     Long QT syndrome Neg Hx     SIDS Neg Hx        Social History     Socioeconomic History    Marital status: Single     Spouse name: Not on file    Number of children: Not on file    Years of education: Not on file    Highest education level: Not on file   Occupational History    Not on file   Social Needs    Financial resource strain: Not on file    Food insecurity     Worry: Not on file     Inability: Not on file    Transportation needs     Medical: Not on file     Non-medical: Not on file   Tobacco Use    Smoking status: Never Smoker    Smokeless tobacco: Never Used   Substance and Sexual Activity    Alcohol use: No    Drug use: Never    Sexual activity: Not on file   Lifestyle    Physical activity     Days per week: Not on file     Minutes per session: Not on file    Stress: Not on file   Relationships    Social connections     Talks on phone: Not on file     Gets together: Not on file     Attends Spiritism service: Not on file     Active member of club or organization: Not on file     Attends meetings of clubs or organizations: Not on file     Relationship status: Not on file   Other Topics Concern    Not on file   Social History Narrative    Graduated from [employer] with degree in psychology.  Working on getting employment.         Objective:      Physical Exam   Constitutional: He is oriented to person, place, and time. He appears well-developed and well-nourished. No distress.   HENT:   Head: Normocephalic and atraumatic.   Mouth/Throat: Oropharynx is clear and moist. No oropharyngeal exudate.   Eyes: Pupils are equal, round, and reactive to light. Right eye exhibits no discharge. Left eye exhibits no discharge.   Neck: Normal range of motion. Neck supple. No JVD present.   Cardiovascular: Normal rate and regular rhythm.   Murmur (II/VI CULLEN) heard.  Pulmonary/Chest: Effort normal and breath sounds normal. No stridor. No  "respiratory distress. He has no wheezes.   Abdominal: Soft. Bowel sounds are normal. He exhibits no distension. There is no hepatosplenomegaly. There is no abdominal tenderness.   Musculoskeletal: Normal range of motion.         General: No deformity or edema.   Neurological: He is alert and oriented to person, place, and time. He exhibits normal muscle tone.   Skin: Skin is warm and dry. No rash noted. He is not diaphoretic. No erythema.   Psychiatric: He has a normal mood and affect. His behavior is normal.       /73 (BP Location: Left arm, Patient Position: Sitting)   Pulse 80   Ht 5' 11" (1.803 m)   Wt 77.5 kg (170 lb 13.7 oz)   SpO2 99%   BMI 23.83 kg/m²         Studies:  Echo 8/12/20:  S/P arterial switch for d-transposition-  Right ventricle appears normal in size and structure with qualitati not demonstrated.  vely good right ventricular systolic function.  Right ventricular systolic pressure estimated 24 mm Hg plus right atrial pressure.  Echodensity consistent with patch repair of ventricular septal defect and no residual shunt demonstrated.  Pulmonary arteries arise from anteriorly positioned pulmonary valve and pass to opposite sides of the aorta status post Ana maneuver.  Right pulmonary artery suggest no significant stenosis.    Limited imaging of proximal left pulmonary artery with minimal acceleration to peak velocity <1.8 m/sec passing to the left of the aorta  The left atrium appears normal in size.  There is mild discrete jet of mitral insufficiency.  Left ventricle appears normal in size and structure.  There is paradoxical septal motion with good movement of the left ventricular free wall, shortening fraction measured 39% and ejection fraction estimated 50 -55% from apical views.  Normal indices of left ventricular diastolic function.  There is no obvious subaortic obstruction in the LV outflow (25 mm diameter).  Bicuspid neoaortic valve with mild acceleration to peak velocity " <2.3 m/sec and mean gradient < 15 mm Hg.  There is a trivial jet of janelle aortic insufficiency.  Aortic dimensions:  Sinuses of Valsalva = 50 mm.  ST junction             = 36 mm.  Ascending aorta     = 33 mm.     Cardiac MRI 20:  1. Normal right ventricular volumes. RVEF 56 %.  2. Pulmonary insufficiency, regurgitant fraction 5%, regurgitant volume 3 ml.    3. The supravlavar RVOT is narrowed in the AP direction, but normally sized from a left-right orientation.   4. The pulmonary arteries are draped anterior to the aorta s/p Humble maneuver. Symmetric branch pulmonary arteries with mild hypoplasia and no evidence of discrete stenosis.   5. Normal left ventricular volumes with LVEF 53%. There is significant dyskinesis in the region of the VSD patch. The remainder of the ventricle has normal appearing wall motion.  6. Bicuspid janelle-aortic valve with normally sized annulus, doming leaflets. Trivial-mild aortic insufficiency, regurgitant fraction 14%, regurgitant volume 12 ml.   7. Severely enlarged aortic root at the sinuses of Valsalva (63mm) with effacement of the ST junction (63mm) and significant dilated of the ascending aorta just inferior to the branch PAs (64mm). The right side of the ascending aorta is asymmetrically dilated/aneurysmal from the LVOT jet.   All physician notes and studies have been reviewed in detail.    Assessment & Plan:       Ryan Sigala is a 24 y.o. male with repaired complex congenital heart disease.  To summarize, his diagnoses are as follows:  1.  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  2.  Significant neoaortic valve insufficiency and neoaortic root  enlargement status post repeat resection of sub-neoaortic valve obstruction and aortic root plication in 2008 at [facility] by Dr. Willard.              - Mild neoaortic valve insufficiency and trivial stenosis              - Significant, likely stable aortic root enlargement  3.  Likely mild branch pulmonary artery stenosis without evidence of significant obstruction.       His aortic root enlargement is likely stable in origin. We do not plan on a surgical intervention at this time. He should continue to follow up with adult congenital cardiology. He will have a cardiac MRI in one year and we will continue to follow along. All questions and concerns were addressed.

## 2021-03-14 ENCOUNTER — IMMUNIZATION (OUTPATIENT)
Dept: INTERNAL MEDICINE | Facility: CLINIC | Age: 26
End: 2021-03-14

## 2021-03-14 DIAGNOSIS — Z23 NEED FOR VACCINATION: Primary | ICD-10-CM

## 2021-03-14 PROCEDURE — 0031A COVID-19,VECTOR-NR,RS-AD26,PF,0.5 ML DOSE VACCINE (JANSSEN): CPT | Mod: CV19,,, | Performed by: FAMILY MEDICINE

## 2021-03-14 PROCEDURE — 91303 COVID-19,VECTOR-NR,RS-AD26,PF,0.5 ML DOSE VACCINE (JANSSEN): CPT | Mod: ,,, | Performed by: FAMILY MEDICINE

## 2021-03-14 PROCEDURE — 0031A COVID-19,VECTOR-NR,RS-AD26,PF,0.5 ML DOSE VACCINE (JANSSEN): ICD-10-PCS | Mod: CV19,,, | Performed by: FAMILY MEDICINE

## 2021-03-14 PROCEDURE — 91303 COVID-19,VECTOR-NR,RS-AD26,PF,0.5 ML DOSE VACCINE (JANSSEN): ICD-10-PCS | Mod: ,,, | Performed by: FAMILY MEDICINE

## 2022-01-11 ENCOUNTER — DOCUMENTATION ONLY (OUTPATIENT)
Dept: PEDIATRIC CARDIOLOGY | Facility: CLINIC | Age: 27
End: 2022-01-11
Payer: COMMERCIAL

## 2022-01-11 NOTE — PROGRESS NOTES
I reviewed a letter sent by Dr. Tapia at [facility].  Letter is not dated, but was received 2021. Patient was presented at the [facility] conference.  They reviewed an echocardiogram performed in 2020. They reviewed a cardiac CT scan from 2019. The recommendation was to repeat axial imaging.  If there was evidence of progressive enlargement, they recommended surgical intervention.    Patient had a cardiac MRI August 12, 2020 that revealed dilated sinus of Valsalva at 6.3 cm and an ascending aorta of 6.4 cm, both of which were stable compared to echo imaging.    I would like to get him in with a repeat cardiac MRI in the near future to make sure there has been no progressive enlargement of the aortic root.

## 2022-01-31 DIAGNOSIS — Z87.74 S/P VSD REPAIR: Primary | ICD-10-CM

## 2022-01-31 DIAGNOSIS — Q23.1 BICUSPID AORTIC VALVE: ICD-10-CM

## 2022-01-31 DIAGNOSIS — Q24.9 CONGENITAL MALFORMATION OF HEART, UNSPECIFIED: ICD-10-CM

## 2022-01-31 DIAGNOSIS — R55 VASOVAGAL NEAR SYNCOPE: ICD-10-CM

## 2022-04-07 ENCOUNTER — HOSPITAL ENCOUNTER (OUTPATIENT)
Dept: RADIOLOGY | Facility: HOSPITAL | Age: 27
Discharge: HOME | End: 2022-04-07
Attending: PEDIATRICS

## 2022-04-07 DIAGNOSIS — Z87.74 S/P VSD REPAIR: ICD-10-CM

## 2022-04-07 DIAGNOSIS — Q23.1 BICUSPID AORTIC VALVE: ICD-10-CM

## 2022-04-07 DIAGNOSIS — R55 VASOVAGAL NEAR SYNCOPE: ICD-10-CM

## 2022-04-07 DIAGNOSIS — Q24.9 CONGENITAL MALFORMATION OF HEART, UNSPECIFIED: ICD-10-CM

## 2022-04-07 PROCEDURE — 75561 CARDIAC MRI FOR MORPH W/DYE: CPT | Mod: 26,,, | Performed by: INTERNAL MEDICINE

## 2022-04-07 PROCEDURE — 75561 MRI CARDIAC MORPHOLOGY FUNCTION W WO: ICD-10-PCS | Mod: 26,,, | Performed by: INTERNAL MEDICINE

## 2022-04-07 PROCEDURE — 75561 CARDIAC MRI FOR MORPH W/DYE: CPT | Mod: 26,,, | Performed by: PEDIATRICS

## 2022-04-07 PROCEDURE — 75561 CARDIAC MRI FOR MORPH W/DYE: CPT | Mod: TC

## 2022-04-07 PROCEDURE — 25500020 PHARM REV CODE 255: Performed by: PEDIATRICS

## 2022-04-07 PROCEDURE — A9577 INJ MULTIHANCE: HCPCS | Performed by: PEDIATRICS

## 2022-04-07 PROCEDURE — 75561 CV CARDIAC MRI MORPHOLOGY (CUPID ONLY): ICD-10-PCS | Mod: 26,,, | Performed by: PEDIATRICS

## 2022-04-07 RX ADMIN — GADOBENATE DIMEGLUMINE 34 ML: 529 INJECTION, SOLUTION INTRAVENOUS at 08:04

## 2022-04-11 ENCOUNTER — PATIENT MESSAGE (OUTPATIENT)
Dept: CARDIOLOGY | Facility: CLINIC | Age: 27
End: 2022-04-11
Payer: COMMERCIAL

## 2022-04-21 ENCOUNTER — OFFICE VISIT (OUTPATIENT)
Dept: CARDIOLOGY | Facility: CLINIC | Age: 27
End: 2022-04-21
Payer: COMMERCIAL

## 2022-04-21 ENCOUNTER — HOSPITAL ENCOUNTER (OUTPATIENT)
Dept: CARDIOLOGY | Facility: CLINIC | Age: 27
Discharge: HOME | End: 2022-04-21
Payer: COMMERCIAL

## 2022-04-21 VITALS
BODY MASS INDEX: 24.29 KG/M2 | OXYGEN SATURATION: 97 % | SYSTOLIC BLOOD PRESSURE: 114 MMHG | HEART RATE: 80 BPM | HEIGHT: 71 IN | WEIGHT: 173.5 LBS | DIASTOLIC BLOOD PRESSURE: 73 MMHG

## 2022-04-21 DIAGNOSIS — Q23.1 BICUSPID AORTIC VALVE: ICD-10-CM

## 2022-04-21 DIAGNOSIS — Q20.3 TRANSPOSITION OF THE GREAT ARTERIES, ISOLATED (SDD): ICD-10-CM

## 2022-04-21 DIAGNOSIS — Z87.74 S/P VSD REPAIR: ICD-10-CM

## 2022-04-21 DIAGNOSIS — R55 VASOVAGAL NEAR SYNCOPE: ICD-10-CM

## 2022-04-21 DIAGNOSIS — Q24.9 CONGENITAL MALFORMATION OF HEART, UNSPECIFIED: ICD-10-CM

## 2022-04-21 DIAGNOSIS — I77.810 AORTIC ROOT DILATION: ICD-10-CM

## 2022-04-21 DIAGNOSIS — Q24.9 ADULT CONGENITAL HEART DISEASE: Primary | ICD-10-CM

## 2022-04-21 PROCEDURE — 99214 PR OFFICE/OUTPT VISIT, EST, LEVL IV, 30-39 MIN: ICD-10-PCS | Mod: 25,S$GLB,, | Performed by: PEDIATRICS

## 2022-04-21 PROCEDURE — 93005 EKG 12-LEAD: ICD-10-PCS | Mod: S$GLB,,, | Performed by: PEDIATRICS

## 2022-04-21 PROCEDURE — 3074F SYST BP LT 130 MM HG: CPT | Mod: CPTII,S$GLB,, | Performed by: PEDIATRICS

## 2022-04-21 PROCEDURE — 3008F PR BODY MASS INDEX (BMI) DOCUMENTED: ICD-10-PCS | Mod: CPTII,S$GLB,, | Performed by: PEDIATRICS

## 2022-04-21 PROCEDURE — 99214 OFFICE O/P EST MOD 30 MIN: CPT | Mod: 25,S$GLB,, | Performed by: PEDIATRICS

## 2022-04-21 PROCEDURE — 93005 ELECTROCARDIOGRAM TRACING: CPT | Mod: S$GLB,,, | Performed by: PEDIATRICS

## 2022-04-21 PROCEDURE — 99999 PR PBB SHADOW E&M-EST. PATIENT-LVL III: ICD-10-PCS | Mod: PBBFAC,,, | Performed by: PEDIATRICS

## 2022-04-21 PROCEDURE — 93010 EKG 12-LEAD: ICD-10-PCS | Mod: S$GLB,,, | Performed by: INTERNAL MEDICINE

## 2022-04-21 PROCEDURE — 3074F PR MOST RECENT SYSTOLIC BLOOD PRESSURE < 130 MM HG: ICD-10-PCS | Mod: CPTII,S$GLB,, | Performed by: PEDIATRICS

## 2022-04-21 PROCEDURE — 3078F DIAST BP <80 MM HG: CPT | Mod: CPTII,S$GLB,, | Performed by: PEDIATRICS

## 2022-04-21 PROCEDURE — 1159F PR MEDICATION LIST DOCUMENTED IN MEDICAL RECORD: ICD-10-PCS | Mod: CPTII,S$GLB,, | Performed by: PEDIATRICS

## 2022-04-21 PROCEDURE — 99999 PR PBB SHADOW E&M-EST. PATIENT-LVL III: CPT | Mod: PBBFAC,,, | Performed by: PEDIATRICS

## 2022-04-21 PROCEDURE — 1159F MED LIST DOCD IN RCRD: CPT | Mod: CPTII,S$GLB,, | Performed by: PEDIATRICS

## 2022-04-21 PROCEDURE — 93010 ELECTROCARDIOGRAM REPORT: CPT | Mod: S$GLB,,, | Performed by: INTERNAL MEDICINE

## 2022-04-21 PROCEDURE — 3008F BODY MASS INDEX DOCD: CPT | Mod: CPTII,S$GLB,, | Performed by: PEDIATRICS

## 2022-04-21 PROCEDURE — 3078F PR MOST RECENT DIASTOLIC BLOOD PRESSURE < 80 MM HG: ICD-10-PCS | Mod: CPTII,S$GLB,, | Performed by: PEDIATRICS

## 2022-04-21 NOTE — PROGRESS NOTES
2022    re:Ryan Sigala  :1995    Connie Mckeon MD  [address] [facility] [address]    Dr. Kaya Tapia - [facility]  Fax: [phone]    [facility]    Ryan Sigala is a 26 y.o. male seen today for his initial visit in the [facility] for evaluation of his complex congenital heart disease.  To summarize, his diagnoses are as follows:  1.  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  2.  Significant neoaortic valve insufficiency and neoaortic root enlargement status post repeat resection of sub-neoaortic valve obstruction and aortic root plication in  at [facility] by Dr. Willard.   - Mild neoaortic valve insufficiency and trivial stenosis   - Significant, likely stable aortic root enlargement (about 6.5-6.7cm)  3.  Likely mild branch pulmonary artery stenosis without evidence of significant obstruction.    To summarize, my plan is as follows:  1.  I would recommend against heavy lifting and contact sports.  Regular light exercise is encouraged.  2.  As per guidelines, he does not require endocarditis prophylaxis before procedures.  However, I have no problems with them continuing this practice.  Regular dental care and good dental hygiene is necessary.  3.  I will reach out again to the [facility] team at [facility] for their advice.  4.  For now, would recommend conservative management with repeat cardiac echocardiogram in a year and MRI in 2 years, but may alter this based on advice from the [place] team.  5.  Will discuss the potential addition of an ARB  with the [place] team.    Discussion:  At worst, the aortic root has grown 2mm over the last 2 years.  The aortic insufficiency has not changed and is mild.  He is thin and without hypertension, and he has no symptoms.  I appreciate no real change in the aortic root.   I once again reviewed all of this with the patient.  Specifically, we discussed the uncertainty about his risk of aortic dissection.  He does not have a known connective tissue disease.  His aortic root has already been plicated in the past.  He likely has a lot of scar tissue in the mediastinum that may decrease the risk of dissection.  It is unclear to me when we would intervene, so I would like to once again reach out to his surgical team in [place] to get their thoughts.  He is not on an angiotensin receptor blocker or beta-blocker.  In the past, he has been resistant to starting a prophylactic medication when his blood pressure is not elevated, and I have a hard time pushing to starting medication at present when there is no real data to support utility in this clinical scenario.  However, I will also ask the [place] team their thoughts.  Worsening aortic valve insufficiency could potentially prompt the need for surgical intervention.  I would definitely want to replace his aortic root at the same time if it came to that.    I reviewed a letter sent by Dr. Tapia at [facility].  Letter is not dated, but was received 2021. Patient was presented at the [place] [facility] conference.  They reviewed an echocardiogram performed in 2020. They reviewed a cardiac CT scan from 2019. The recommendation was to repeat axial imaging.  If there was evidence of progressive enlargement, they recommended surgical intervention.    History of present illness:  Patient has been doing well.  No chest pain.  No shortness of breath.  No palpitations.  He occasionally gets dizzy when he stands up quickly.  No syncope.  No cyanosis or  "edema.  He exercises regularly, doing light aerobic and light weightlifting.    The review of systems is as noted above. It is otherwise negative for other symptoms related to the general, neurological, psychiatric, endocrine, gastrointestinal, genitourinary, respiratory, dermatologic, musculoskeletal, hematologic, and immunologic systems.    Past Medical History:   Diagnosis Date    Congenital heart disease     D-TGA, VSD, subpulmonary stenosis    TGA/VSD (transposition of great arteries, ventricular septal defect) 1994     Past Surgical History:   Procedure Laterality Date    BT shunt      left thoracotomy, [facility], 3 days of age    CARDIAC SURGERY      Arterial switch, VSD closure, subPS resection at [facility] at 21 months of age    CARDIAC SURGERY  2008    subaortic stenosis resection, aortic root plication     Family History   Problem Relation Age of Onset    Lung cancer Father     Congenital heart disease Neg Hx     Early death Neg Hx     Long QT syndrome Neg Hx     SIDS Neg Hx      Social History     Socioeconomic History    Marital status: Single   Tobacco Use    Smoking status: Never Smoker    Smokeless tobacco: Never Used   Substance and Sexual Activity    Alcohol use: No    Drug use: Never   Social History Narrative    Graduated from [employer] with degree in psychology.  Working on getting employment.     No current outpatient medications on file prior to visit.     No current facility-administered medications on file prior to visit.     Review of patient's allergies indicates:  No Known Allergies     Vitals:    04/21/22 0818 04/21/22 0819   BP: (!) 115/58 114/73   BP Location: Right arm Left arm   Patient Position: Sitting Sitting   BP Method: Large (Automatic) Large (Automatic)   Pulse: 80 80   SpO2: 97%    Weight: 78.7 kg (173 lb 8 oz)    Height: 5' 11" (1.803 m)      In general, he is a very healthy-appearing nondysmorphic male in no apparent distress.  The eyes, nares, and " oropharynx are clear.  Eyelids and conjunctiva are normal without drainage or erythema.  Pupils equal and round bilaterally.  The head is normocephalic and atraumatic.  The neck is supple without jugular venous distention or thyroid enlargement.  The lungs are clear to auscultation bilaterally.  There is a well-healed median sternotomy scar and a well-healed left thoracotomy scar.  The first and second heart sounds are normal.  There are no gallops, rubs, or clicks in the seated position.  He does have a grade 2/6 systolic ejection murmur heard best at the upper left and right sternal borders.  The murmur radiates to his neck.  No diastolic murmur.  The abdominal exam is benign without hepatosplenomegaly, tenderness, or distention.  Pulses are normal in all 4 extremities with brisk capillary refill and no clubbing, cyanosis, or edema.  No rashes are noted.    EKG - borderline LVH.      Cardiac MRI 4/7/22:  1. Normal right ventricular volumes. RVEF 52 %.  2. Pulmonary insufficiency, regurgitant fraction 4%, regurgitant volume 3 ml.    3. The supravalavar RVOT is narrowed in the AP direction, but normally sized from a left-right orientation.   4. The pulmonary arteries are draped anterior to the aorta s/p [place] maneuver. Symmetric branch pulmonary arteries with mild hypoplasia and no evidence of discrete stenosis.   5. Top normal/mildly increased left ventricular volumes with LVEF 50%. The ventricular volumes are increased when compared to last MRI. There is significant dyskinesis in the region of the VSD patch. The remainder of the ventricle has normal appearing wall motion.  6. Bicuspid janelle-aortic valve with normally sized annulus, doming leaflets. Trivial-mild aortic insufficiency, regurgitant fraction 15%, regurgitant volume 16ml.   7. Severely enlarged aortic root at the sinuses of Valsalva (67mm at most today, 65mm on prior MRI 2020) with effacement of the ST junction (60mm, no change) and significant dilated  of the ascending aorta just inferior to the branch PAs (64mm, no change). The right side of the ascending aorta is asymmetrically dilated/aneurysmal from the LVOT jet. When directly compared to prior MRI, there is likely slight increase in size of the aortic root.   8. Of note, there is anterior indentation in the RVOT and MPA from sternal wires.     Cardiac MRI 8/12/20:  1. Normal right ventricular volumes. RVEF 56 %.  2. Pulmonary insufficiency, regurgitant fraction 5%, regurgitant volume 3 ml.    3. The supravlavar RVOT is narrowed in the AP direction, but normally sized from a left-right orientation.   4. The pulmonary arteries are draped anterior to the aorta s/p [place] maneuver. Symmetric branch pulmonary arteries with mild hypoplasia and no evidence of discrete stenosis.   5. Normal left ventricular volumes with LVEF 53%. There is significant dyskinesis in the region of the VSD patch. The remainder of the ventricle has normal appearing wall motion.  6. Bicuspid janelle-aortic valve with normally sized annulus, doming leaflets. Trivial-mild aortic insufficiency, regurgitant fraction 14%, regurgitant volume 12 ml.   7. Severely enlarged aortic root at the sinuses of Valsalva (63mm) with effacement of the ST junction (63mm) and significant dilated of the ascending aorta just inferior to the branch PAs (64mm). The right side of the ascending aorta is asymmetrically dilated/aneurysmal from the LVOT jet.     Thank you for referring this patient to our clinic.  Please call with any questions.    Sincerely,        Darren Valladares MD  Pediatric Cardiology  Adult Congenital Heart Disease  Pediatric Heart Failure and Transplantation  [facility] [address] [place]  [address]  [phone]

## 2022-04-29 ENCOUNTER — DOCUMENTATION ONLY (OUTPATIENT)
Dept: PEDIATRIC CARDIOLOGY | Facility: CLINIC | Age: 27
End: 2022-04-29
Payer: COMMERCIAL

## 2022-04-29 NOTE — PROGRESS NOTES
MRI downloaded to [facility].  Discussed with Oswaldo Tapia MD, the CT surgeon there who reviewed previous data, and he will get back to me.